# Patient Record
Sex: FEMALE | Race: WHITE | Employment: OTHER | URBAN - METROPOLITAN AREA
[De-identification: names, ages, dates, MRNs, and addresses within clinical notes are randomized per-mention and may not be internally consistent; named-entity substitution may affect disease eponyms.]

---

## 2019-01-01 ENCOUNTER — TELEPHONE (OUTPATIENT)
Dept: GERIATRICS | Facility: CLINIC | Age: 84
End: 2019-01-01

## 2019-09-06 ENCOUNTER — TRANSFERRED RECORDS (OUTPATIENT)
Dept: HEALTH INFORMATION MANAGEMENT | Facility: CLINIC | Age: 84
End: 2019-09-06

## 2019-09-06 ENCOUNTER — NURSING HOME VISIT (OUTPATIENT)
Dept: GERIATRICS | Facility: CLINIC | Age: 84
End: 2019-09-06
Payer: COMMERCIAL

## 2019-09-06 ENCOUNTER — DOCUMENTATION ONLY (OUTPATIENT)
Dept: OTHER | Facility: CLINIC | Age: 84
End: 2019-09-06

## 2019-09-06 VITALS
HEART RATE: 86 BPM | WEIGHT: 224.6 LBS | OXYGEN SATURATION: 92 % | RESPIRATION RATE: 20 BRPM | DIASTOLIC BLOOD PRESSURE: 83 MMHG | TEMPERATURE: 97.8 F | SYSTOLIC BLOOD PRESSURE: 160 MMHG

## 2019-09-06 DIAGNOSIS — G47.00 INSOMNIA, UNSPECIFIED TYPE: ICD-10-CM

## 2019-09-06 DIAGNOSIS — M62.81 GENERALIZED MUSCLE WEAKNESS: Primary | ICD-10-CM

## 2019-09-06 DIAGNOSIS — G30.1 LATE ONSET ALZHEIMER'S DISEASE WITHOUT BEHAVIORAL DISTURBANCE (H): ICD-10-CM

## 2019-09-06 DIAGNOSIS — M79.604 PAIN IN BOTH LOWER EXTREMITIES: ICD-10-CM

## 2019-09-06 DIAGNOSIS — E03.9 HYPOTHYROIDISM, UNSPECIFIED TYPE: ICD-10-CM

## 2019-09-06 DIAGNOSIS — I50.812 HYPERTENSIVE HEART DISEASE WITH CHRONIC RIGHT-SIDED CONGESTIVE HEART FAILURE (H): ICD-10-CM

## 2019-09-06 DIAGNOSIS — F02.80 LATE ONSET ALZHEIMER'S DISEASE WITHOUT BEHAVIORAL DISTURBANCE (H): ICD-10-CM

## 2019-09-06 DIAGNOSIS — G62.9 PERIPHERAL POLYNEUROPATHY: ICD-10-CM

## 2019-09-06 DIAGNOSIS — M79.605 PAIN IN BOTH LOWER EXTREMITIES: ICD-10-CM

## 2019-09-06 DIAGNOSIS — I11.0 HYPERTENSIVE HEART DISEASE WITH CHRONIC RIGHT-SIDED CONGESTIVE HEART FAILURE (H): ICD-10-CM

## 2019-09-06 DIAGNOSIS — R29.6 FALLS FREQUENTLY: ICD-10-CM

## 2019-09-06 DIAGNOSIS — M48.04 SPINAL STENOSIS OF THORACIC REGION: ICD-10-CM

## 2019-09-06 PROCEDURE — 99310 SBSQ NF CARE HIGH MDM 45: CPT | Performed by: NURSE PRACTITIONER

## 2019-09-06 RX ORDER — FUROSEMIDE 40 MG
40 TABLET ORAL DAILY
COMMUNITY
End: 2020-01-01

## 2019-09-06 RX ORDER — LEVOTHYROXINE SODIUM 25 UG/1
25 TABLET ORAL DAILY
COMMUNITY
End: 2020-01-01

## 2019-09-06 RX ORDER — QUETIAPINE FUMARATE 25 MG/1
25 TABLET, FILM COATED ORAL 2 TIMES DAILY
COMMUNITY
End: 2020-01-01

## 2019-09-06 RX ORDER — LANOLIN ALCOHOL/MO/W.PET/CERES
6 CREAM (GRAM) TOPICAL AT BEDTIME
COMMUNITY
End: 2020-01-01

## 2019-09-06 RX ORDER — METOPROLOL SUCCINATE 25 MG/1
12.5 TABLET, EXTENDED RELEASE ORAL DAILY
COMMUNITY

## 2019-09-06 RX ORDER — ACETAMINOPHEN 325 MG/1
650 TABLET ORAL EVERY 4 HOURS PRN
COMMUNITY
End: 2019-09-06

## 2019-09-06 RX ORDER — LISINOPRIL 2.5 MG/1
5 TABLET ORAL DAILY
COMMUNITY

## 2019-09-06 RX ORDER — AMOXICILLIN 250 MG
1 CAPSULE ORAL DAILY PRN
COMMUNITY

## 2019-09-06 RX ORDER — TRAZODONE HYDROCHLORIDE 50 MG/1
12.5 TABLET, FILM COATED ORAL AT BEDTIME
COMMUNITY
End: 2019-10-23

## 2019-09-06 RX ORDER — ACETAMINOPHEN 500 MG
1000 TABLET ORAL 3 TIMES DAILY
COMMUNITY

## 2019-09-06 NOTE — LETTER
9/6/2019        RE: Lizzy Adames  [address]  [place] [address]        [facility]  PRIMARY CARE PROVIDER AND CLINIC:  No primary care provider on file., No primary physician on file.  Chief Complaint   Patient presents with     Establish Care     [place] Medical Record Number:  [id number]  Place of Service where encounter took place:  [facility] ([facility] [[id number]]    Lizzy Adames  is a 89 year old  (7/21/1926), admitted to the above facility from  [facility], [place]. Hospital stay 9/2 through 9/4..  Admitted to this facility for  rehab.    HPI:    HPI information obtained from: facility chart records, facility staff, patient report, [facility] chart review, Care Everywhere Epic chart review and family/first contact jai report.   Brief Summary of Hospital Course:   89 y.o. female with a history of breast cancer, cardiomyopathy, congestive heart failure, hypertension, hypertension, mild Alzheimer's dementia with some behavior issues, peripheral neuropathy had been brought into the emergency room due to weakness. Patient is living at assisted in assisted living facility. She has been gradually getting weaker over the last 3 weeks, having more difficulties transferring and non ambulatory x 2 months. The assisted-living facility did not feel that he could safely care for her any longer.     Patient was admitted to the hospital with weakness. Labs were unremarkable except for a mildly elevated TSH and low normal T4. Given her cognitive impairment, low dose synthroid was started. CT lumbar spine obtained due to right lower extremity atrophy at calf, which showed some moderate spinal stenosis, but no neurologic compromise. Patient worked with physical therapy, and was felt to be at her baseline.     Updates on Status Since Skilled nursing Admission:   Nursing reports severe pain in legs with minimal leg movement. Non ambulatory x 2 months per patient's daughter.   Met  with patient in her room today, she is lying in bed. She is alert to self only. She winces with minimal movement to lower legs. No signs of pain when at rest. Breathing non labored.     CODE STATUS/ADVANCE DIRECTIVES DISCUSSION:   DNR only  Patient's living condition: lives alone  ALLERGIES: Aspirin and Penicillins  PAST MEDICAL HISTORY:  has no past medical history on file.  PAST SURGICAL HISTORY:   has no past surgical history on file.  FAMILY HISTORY: family history is not on file.  SOCIAL HISTORY:       Post Discharge Medication Reconciliation Status: discharge medications reconciled, continue medications without change    Current Outpatient Medications   Medication Sig Dispense Refill     acetaminophen (TYLENOL) 500 MG tablet Take 1,000 mg by mouth 3 times daily       furosemide (LASIX) 40 MG tablet Take 40 mg by mouth daily       levothyroxine (SYNTHROID/LEVOTHROID) 25 MCG tablet Take 25 mcg by mouth daily       lisinopril (PRINIVIL/ZESTRIL) 2.5 MG tablet Take 2.5 mg by mouth daily       melatonin 3 MG tablet Take 1 mg by mouth nightly as needed for sleep       metoprolol succinate ER (TOPROL-XL) 25 MG 24 hr tablet Take 25 mg by mouth daily       miconazole (MICATIN) 2 % AERP powder Apply to affected area topically 2 times daily as needed       phenylephrine-shark liver oil-mineral oil-petrolatum (PREPARATION H) 0.25-14-74.9 % rectal ointment Place rectally 3 times daily as needed for hemorrhoids       QUEtiapine (SEROQUEL) 25 MG tablet Take 25 mg by mouth 2 times daily       senna-docusate (SENOKOT-S/PERICOLACE) 8.6-50 MG tablet Take 1 tablet by mouth daily as needed for constipation       traZODone (DESYREL) 50 MG tablet Take 25 mg by mouth At Bedtime         ROS:  Limited secondary to cognitive impairment but today pt reports pain in legs    Vitals:  BP (!) 160/83   Pulse 86   Temp 97.8  F (36.6  C)   Resp 20   Wt 101.9 kg (224 lb 9.6 oz)   SpO2 92%   Exam:  GENERAL APPEARANCE:  morbidly obese, not  able to follow commands, alert  RESP:  lungs clear to auscultation , no respiratory distress  CV:  regular rate and rhythm, no murmur, rub, or gallop, + 1 BLE  ABDOMEN:  obese, soft, non tender, no guarding or rebound, bowel sounds normal  M/S:   Moves BUE, not able to move legs, right leg externally rotated and slightly shorter than left  NEURO:   radicular pain down sherry legs  PSYCH:  insight and judgement impaired, memory impaired , alert to self only, verbal, calm    Lab/Diagnostic data:  Recent labs in [facility] reviewed by me today.     ASSESSMENT/PLAN:  Generalized muscle weakness  Falls frequently  Peripheral polyneuropathy  Spinal stenosis  Pain in bilateral lower extremities  - Reviewed ER notes. Lumbar imaging with spinal stenosis  - patient in TCU x one day. Currently having severe pain. Non ambulatory at baseline.   - Will get xray of hip/pelvis and femur. Discussed with patient's dtr Marlene. Dtr would NOT a surgical intervention if fracture were present. Discussed options such as a comfort based approach. Family would like to discuss options if fracture present such as non wt bearing and comfort.   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 Per patient's dtr was ambulating until about 2 months ago  - hx of behaviors, none currently- continue seroquel  - will have OT eval    Insomnia, unspecified type  - per hx. Patient on melatonin and trazadone  - nsg to update with concerns    Hypertensive heart disease with chronic right-sided congestive heart failure (H)  - chronic edema per dtr, no changes  - abd non distended  - plan to monitor wts and vitals, nsg to update with concenrs.     Hypothyroidism, unspecified type  - started on low dose synthroid 9/3/19  - hospital labs:         transcribed by : Natividad  Ct  Orders:  1. Pelvis xray, R hip 3 view xray, R femur xray - dx: Pain/fall  2. CBC, CMP, Sed Rate, Vit. D - dx: acute pain, confusion  3. OT update with COG scores  4. Tylenol 1000 mg TID - dx: Pain  5. Call Leah NP first with xray results, if no answer ok to call on-call provider    Total time spent with patient visit at the skilled nursing facility was 45 min including patient visit, review of past records and phone call to patient contact. Greater than 50% of total time spent with counseling and coordinating care due to coordinating care with nurse on admission orders and counseling family for possibel fracture treatment and end of life wishes minutes on: the plan of SNF stay and projected length of stay, current medications (treatments) reconciled from the hospital and current pain control plan and controlled substances ordered and taper plan of care.    Electronically signed by:  KENYON Allen CNP                       Sincerely,        KENYON Allen CNP

## 2019-09-06 NOTE — PROGRESS NOTES
[facility]  PRIMARY CARE PROVIDER AND CLINIC:  No primary care provider on file., No primary physician on file.  Chief Complaint   Patient presents with     Establish Care     [place] Medical Record Number:  [id number]  Place of Service where encounter took place:  [facility] ([facility] [[id number]]    Lizzy Adames  is a 89 year old  (7/21/1926), admitted to the above facility from  [facility]. Hospital stay 9/2 through 9/4..  Admitted to this facility for  rehab.    HPI:    HPI information obtained from: facility chart records, facility staff, patient report, [facility] chart review, Care Everywhere Epic chart review and family/first contact jai report.   Brief Summary of Hospital Course:   89 y.o. female with a history of breast cancer, cardiomyopathy, congestive heart failure, hypertension, hypertension, mild Alzheimer's dementia with some behavior issues, peripheral neuropathy had been brought into the emergency room due to weakness. Patient is living at assisted in assisted living facility. She has been gradually getting weaker over the last 3 weeks, having more difficulties transferring and non ambulatory x 2 months. The assisted-living facility did not feel that he could safely care for her any longer.     Patient was admitted to the hospital with weakness. Labs were unremarkable except for a mildly elevated TSH and low normal T4. Given her cognitive impairment, low dose synthroid was started. CT lumbar spine obtained due to right lower extremity atrophy at calf, which showed some moderate spinal stenosis, but no neurologic compromise. Patient worked with physical therapy, and was felt to be at her baseline.     Updates on Status Since Skilled nursing Admission:   Nursing reports severe pain in legs with minimal leg movement. Non ambulatory x 2 months per patient's daughter.   Met with patient in her room today, she is lying in bed. She is alert to self  only. She winces with minimal movement to lower legs. No signs of pain when at rest. Breathing non labored.     CODE STATUS/ADVANCE DIRECTIVES DISCUSSION:   DNR only  Patient's living condition: lives alone  ALLERGIES: Aspirin and Penicillins  PAST MEDICAL HISTORY:  has no past medical history on file.  PAST SURGICAL HISTORY:   has no past surgical history on file.  FAMILY HISTORY: family history is not on file.  SOCIAL HISTORY:       Post Discharge Medication Reconciliation Status: discharge medications reconciled, continue medications without change    Current Outpatient Medications   Medication Sig Dispense Refill     acetaminophen (TYLENOL) 500 MG tablet Take 1,000 mg by mouth 3 times daily       furosemide (LASIX) 40 MG tablet Take 40 mg by mouth daily       levothyroxine (SYNTHROID/LEVOTHROID) 25 MCG tablet Take 25 mcg by mouth daily       lisinopril (PRINIVIL/ZESTRIL) 2.5 MG tablet Take 2.5 mg by mouth daily       melatonin 3 MG tablet Take 1 mg by mouth nightly as needed for sleep       metoprolol succinate ER (TOPROL-XL) 25 MG 24 hr tablet Take 25 mg by mouth daily       miconazole (MICATIN) 2 % AERP powder Apply to affected area topically 2 times daily as needed       phenylephrine-shark liver oil-mineral oil-petrolatum (PREPARATION H) 0.25-14-74.9 % rectal ointment Place rectally 3 times daily as needed for hemorrhoids       QUEtiapine (SEROQUEL) 25 MG tablet Take 25 mg by mouth 2 times daily       senna-docusate (SENOKOT-S/PERICOLACE) 8.6-50 MG tablet Take 1 tablet by mouth daily as needed for constipation       traZODone (DESYREL) 50 MG tablet Take 25 mg by mouth At Bedtime         ROS:  Limited secondary to cognitive impairment but today pt reports pain in legs    Vitals:  BP (!) 160/83   Pulse 86   Temp 97.8  F (36.6  C)   Resp 20   Wt 101.9 kg (224 lb 9.6 oz)   SpO2 92%   Exam:  GENERAL APPEARANCE:  morbidly obese, not able to follow commands, alert  RESP:  lungs clear to auscultation , no  respiratory distress  CV:  regular rate and rhythm, no murmur, rub, or gallop, + 1 BLE  ABDOMEN:  obese, soft, non tender, no guarding or rebound, bowel sounds normal  M/S:   Moves BUE, not able to move legs, right leg externally rotated and slightly shorter than left  NEURO:   radicular pain down sherry legs  PSYCH:  insight and judgement impaired, memory impaired , alert to self only, verbal, calm    Lab/Diagnostic data:  Recent labs in [facility] reviewed by me today.     ASSESSMENT/PLAN:  Generalized muscle weakness  Falls frequently  Peripheral polyneuropathy  Spinal stenosis  Pain in bilateral lower extremities  - Reviewed ER notes. Lumbar imaging with spinal stenosis  - patient in TCU x one day. Currently having severe pain. Non ambulatory at baseline.   - Will get xray of hip/pelvis and femur. Discussed with patient's dtr Marlene. Dtr would NOT a surgical intervention if fracture were present. Discussed options such as a comfort based approach. Family would like to discuss options if fracture present such as non wt bearing and comfort.   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 Per patient's dtr was ambulating until about 2 months ago  - hx of behaviors, none currently- continue seroquel  - will have OT eval    Insomnia, unspecified type  - per hx. Patient on melatonin and trazadone  - nsg to update with concerns    Hypertensive heart disease with chronic right-sided congestive heart failure (H)  - chronic edema per dtr, no changes  - abd non distended  - plan to monitor wts and vitals, nsg to update with concenrs.     Hypothyroidism, unspecified type  - started on low dose synthroid 9/3/19  - hospital labs:         transcribed by : Natividad Fofana  Orders:  1. Pelvis xray, R hip 3 view xray, R femur xray - dx:  Pain/fall  2. CBC, CMP, Sed Rate, Vit. D - dx: acute pain, confusion  3. OT update with COG scores  4. Tylenol 1000 mg TID - dx: Pain  5. Call Leah NP first with xray results, if no answer ok to call on-call provider    Total time spent with patient visit at the skilled nursing facility was 45 min including patient visit, review of past records and phone call to patient contact. Greater than 50% of total time spent with counseling and coordinating care due to coordinating care with nurse on admission orders and counseling family for possibel fracture treatment and end of life wishes minutes on: the plan of SNF stay and projected length of stay, current medications (treatments) reconciled from the hospital and current pain control plan and controlled substances ordered and taper plan of care.    Electronically signed by:  KENYON Allen CNP

## 2019-09-09 ENCOUNTER — NURSING HOME VISIT (OUTPATIENT)
Dept: GERIATRICS | Facility: CLINIC | Age: 84
End: 2019-09-09
Payer: COMMERCIAL

## 2019-09-09 VITALS
SYSTOLIC BLOOD PRESSURE: 137 MMHG | DIASTOLIC BLOOD PRESSURE: 59 MMHG | OXYGEN SATURATION: 92 % | RESPIRATION RATE: 18 BRPM | HEART RATE: 50 BPM | WEIGHT: 200 LBS | TEMPERATURE: 97.7 F

## 2019-09-09 DIAGNOSIS — I11.0 HYPERTENSIVE HEART DISEASE WITH CHRONIC RIGHT-SIDED CONGESTIVE HEART FAILURE (H): ICD-10-CM

## 2019-09-09 DIAGNOSIS — F02.80 LATE ONSET ALZHEIMER'S DISEASE WITHOUT BEHAVIORAL DISTURBANCE (H): ICD-10-CM

## 2019-09-09 DIAGNOSIS — M79.605 PAIN IN BOTH LOWER EXTREMITIES: ICD-10-CM

## 2019-09-09 DIAGNOSIS — G62.9 PERIPHERAL POLYNEUROPATHY: ICD-10-CM

## 2019-09-09 DIAGNOSIS — G47.00 INSOMNIA, UNSPECIFIED TYPE: ICD-10-CM

## 2019-09-09 DIAGNOSIS — I50.812 HYPERTENSIVE HEART DISEASE WITH CHRONIC RIGHT-SIDED CONGESTIVE HEART FAILURE (H): ICD-10-CM

## 2019-09-09 DIAGNOSIS — M62.81 GENERALIZED MUSCLE WEAKNESS: Primary | ICD-10-CM

## 2019-09-09 DIAGNOSIS — G30.1 LATE ONSET ALZHEIMER'S DISEASE WITHOUT BEHAVIORAL DISTURBANCE (H): ICD-10-CM

## 2019-09-09 DIAGNOSIS — M79.604 PAIN IN BOTH LOWER EXTREMITIES: ICD-10-CM

## 2019-09-09 PROCEDURE — 99309 SBSQ NF CARE MODERATE MDM 30: CPT | Performed by: NURSE PRACTITIONER

## 2019-09-09 RX ORDER — GABAPENTIN 100 MG/1
300 CAPSULE ORAL 3 TIMES DAILY
COMMUNITY

## 2019-09-09 NOTE — PROGRESS NOTES
[facility]  [place] Medical Record Number:  [id number]  [facility] [[id number]]  Chief Complaint   Patient presents with     RECHECK       HPI:    Lizzy Adames  is a 89 year old (7/21/1926), who is being seen today for an episodic care visit.  HPI information obtained from: facility chart records, facility staff, patient report and [facility] chart review.    Seeing patient today or a follow up.   Patient continues to report pain in both legs with minimal movement. Recent xrays reviewed with no evidence of a fracture. Patient currently working with physical therapy. Her goal is to be able to sit on the toilet.     No reports of dizzy spells or CP. Has ongoing edema.     Past Medical and Surgical History reviewed in Epic today.    MEDICATIONS:  Current Outpatient Medications   Medication Sig Dispense Refill     acetaminophen (TYLENOL) 500 MG tablet Take 1,000 mg by mouth 3 times daily       furosemide (LASIX) 40 MG tablet Take 40 mg by mouth daily       gabapentin (NEURONTIN) 100 MG capsule Take 100 mg by mouth At Bedtime       levothyroxine (SYNTHROID/LEVOTHROID) 25 MCG tablet Take 25 mcg by mouth daily       lisinopril (PRINIVIL/ZESTRIL) 2.5 MG tablet Take 2.5 mg by mouth daily       melatonin 3 MG tablet Take 1 mg by mouth nightly as needed for sleep       metoprolol succinate ER (TOPROL-XL) 25 MG 24 hr tablet Take 12.5 mg by mouth daily        miconazole (MICATIN) 2 % AERP powder Apply to affected area topically 2 times daily as needed       phenylephrine-shark liver oil-mineral oil-petrolatum (PREPARATION H) 0.25-14-74.9 % rectal ointment Place rectally 3 times daily as needed for hemorrhoids       QUEtiapine (SEROQUEL) 25 MG tablet Take 25 mg by mouth 2 times daily       senna-docusate (SENOKOT-S/PERICOLACE) 8.6-50 MG tablet Take 1 tablet by mouth daily as needed for constipation       TRAZODONE HCL PO Take  12.5 mg by mouth At Bedtime       traZODone (DESYREL) 50 MG tablet Take 12.5 mg by mouth At Bedtime            REVIEW OF SYSTEMS:  Limited secondary to cognitive impairment but today pt reports pain in her legs    Objective:  /59   Pulse 50   Temp 97.7  F (36.5  C)   Resp 18   Wt 90.7 kg (200 lb)   SpO2 92%   Exam:  GENERAL APPEARANCE:  Alert, in no distress, morbidly obese, cooperative  RESP:  lungs clear to auscultation , no respiratory distress  CV:  regular rate and rhythm, no murmur,  trace edema in lower legs  M/S:   pain with SLR and ROM bilateral hips and legs  PSYCH:  insight and judgement impaired, memory impaired     Labs:   Recent labs in [facility] reviewed by me today.     ASSESSMENT/PLAN:  Generalized muscle weakness  Pain in both lower extremities  Peripheral polyneuropathy  - xrays negative for fx  - will add gabapentin at HS, consider increase if patient tolerates as this may help w pain  - continue scheduled tylenol    Late onset Alzheimer's disease without behavioral disturbance  - no behavior concerns, alert and conversant    Hypertensive heart disease with chronic right-sided congestive heart failure (H)  - -140, HR 50-80, will decrease metoprolol  - goal SBP ~ 140 d/t advanced age and goal HR >60    Insomnia, unspecified type  - no sleep concerns reported.   - plan to decrease trazadone from 25 to 12.5  - gabapentin may help w/ sleep   - if no sleep concerns at f/u will stop trazadone      transcribed by : Natividad Fofana  Orders:  1. Decrease Metoprolol to 12.5 mg every day - dx: CHF  2. Weigh pt. Every day. Update NP if wt >3lbs in one day or 5 lbs in one week - dx: CHF  3. Decrease Trazodone to 12.5 mg at bedtime   4. Gabapentin 100 mg at bedtime - dx: leg pain    Electronically signed by:  KENYON Allen CNP

## 2019-09-09 NOTE — LETTER
9/9/2019        RE: Lizzy Adames  [address]  [place] [address]        [facility]  [place] [place] Medical Record Number:  [id number]  Place of Service where encounter took place:  [facility] [[id number]]  Chief Complaint   Patient presents with     RECHECK       HPI:    Lizzy Adames  is a 89 year old (7/21/1926), who is being seen today for an episodic care visit.  HPI information obtained from: facility chart records, facility staff, patient report and [facility] chart review.    Seeing patient today or a follow up.   Patient continues to report pain in both legs with minimal movement. Recent xrays reviewed with no evidence of a fracture. Patient currently working with physical therapy. Her goal is to be able to sit on the toilet.     No reports of dizzy spells or CP. Has ongoing edema.     Past Medical and Surgical History reviewed in Epic today.    MEDICATIONS:  Current Outpatient Medications   Medication Sig Dispense Refill     acetaminophen (TYLENOL) 500 MG tablet Take 1,000 mg by mouth 3 times daily       furosemide (LASIX) 40 MG tablet Take 40 mg by mouth daily       gabapentin (NEURONTIN) 100 MG capsule Take 100 mg by mouth At Bedtime       levothyroxine (SYNTHROID/LEVOTHROID) 25 MCG tablet Take 25 mcg by mouth daily       lisinopril (PRINIVIL/ZESTRIL) 2.5 MG tablet Take 2.5 mg by mouth daily       melatonin 3 MG tablet Take 1 mg by mouth nightly as needed for sleep       metoprolol succinate ER (TOPROL-XL) 25 MG 24 hr tablet Take 12.5 mg by mouth daily        miconazole (MICATIN) 2 % AERP powder Apply to affected area topically 2 times daily as needed       phenylephrine-shark liver oil-mineral oil-petrolatum (PREPARATION H) 0.25-14-74.9 % rectal ointment Place rectally 3 times daily as needed for hemorrhoids       QUEtiapine (SEROQUEL) 25 MG tablet Take 25 mg by mouth 2 times daily       senna-docusate (SENOKOT-S/PERICOLACE) 8.6-50 MG tablet Take 1 tablet  by mouth daily as needed for constipation       TRAZODONE HCL PO Take 12.5 mg by mouth At Bedtime       traZODone (DESYREL) 50 MG tablet Take 12.5 mg by mouth At Bedtime            REVIEW OF SYSTEMS:  Limited secondary to cognitive impairment but today pt reports pain in her legs    Objective:  /59   Pulse 50   Temp 97.7  F (36.5  C)   Resp 18   Wt 90.7 kg (200 lb)   SpO2 92%   Exam:  GENERAL APPEARANCE:  Alert, in no distress, morbidly obese, cooperative  RESP:  lungs clear to auscultation , no respiratory distress  CV:  regular rate and rhythm, no murmur,  trace edema in lower legs  M/S:   pain with SLR and ROM bilateral hips and legs  PSYCH:  insight and judgement impaired, memory impaired     Labs:   Recent labs in [facility] reviewed by me today.     ASSESSMENT/PLAN:  Generalized muscle weakness  Pain in both lower extremities  Peripheral polyneuropathy  - xrays negative for fx  - will add gabapentin at HS, consider increase if patient tolerates as this may help w pain  - continue scheduled tylenol    Late onset Alzheimer's disease without behavioral disturbance  - no behavior concerns, alert and conversant    Hypertensive heart disease with chronic right-sided congestive heart failure (H)  - -140, HR 50-80, will decrease metoprolol  - goal SBP ~ 140 d/t advanced age and goal HR >60    Insomnia, unspecified type  - no sleep concerns reported.   - plan to decrease trazadone from 25 to 12.5  - gabapentin may help w/ sleep   - if no sleep concerns at f/u will stop trazadone      transcribed by : Natividad Fofana  Orders:  1. Decrease Metoprolol to 12.5 mg every day - dx: CHF  2. Weigh pt. Every day. Update NP if wt >3lbs in one day or 5 lbs in one week - dx: CHF  3. Decrease Trazodone to 12.5 mg at bedtime   4. Gabapentin 100 mg at bedtime - dx: leg pain    Electronically signed by:  KENYON Allen CNP             Sincerely,        KENYON Allen CNP

## 2019-09-12 ENCOUNTER — HOSPITAL LABORATORY (OUTPATIENT)
Facility: OTHER | Age: 84
End: 2019-09-12

## 2019-09-12 LAB
ALBUMIN SERPL-MCNC: 3.4 G/DL (ref 3.4–5)
ALP SERPL-CCNC: 91 U/L (ref 40–150)
ALT SERPL W P-5'-P-CCNC: 16 U/L (ref 0–50)
ANION GAP SERPL CALCULATED.3IONS-SCNC: 6 MMOL/L (ref 3–14)
AST SERPL W P-5'-P-CCNC: 14 U/L (ref 0–45)
BILIRUB SERPL-MCNC: 0.4 MG/DL (ref 0.2–1.3)
BUN SERPL-MCNC: 29 MG/DL (ref 7–30)
CALCIUM SERPL-MCNC: 9.1 MG/DL (ref 8.5–10.1)
CHLORIDE SERPL-SCNC: 105 MMOL/L (ref 94–109)
CO2 SERPL-SCNC: 27 MMOL/L (ref 20–32)
CREAT SERPL-MCNC: 0.88 MG/DL (ref 0.52–1.04)
CRP SERPL-MCNC: 19.8 MG/L (ref 0–8)
ERYTHROCYTE [DISTWIDTH] IN BLOOD BY AUTOMATED COUNT: 14.1 % (ref 10–15)
ERYTHROCYTE [SEDIMENTATION RATE] IN BLOOD BY WESTERGREN METHOD: 16 MM/H (ref 0–30)
GFR SERPL CREATININE-BSD FRML MDRD: 57 ML/MIN/{1.73_M2}
GLUCOSE SERPL-MCNC: 64 MG/DL (ref 70–99)
HCT VFR BLD AUTO: 44.5 % (ref 35–47)
HGB BLD-MCNC: 14.1 G/DL (ref 11.7–15.7)
MCH RBC QN AUTO: 29.1 PG (ref 26.5–33)
MCHC RBC AUTO-ENTMCNC: 31.7 G/DL (ref 31.5–36.5)
MCV RBC AUTO: 92 FL (ref 78–100)
PLATELET # BLD AUTO: 307 10E9/L (ref 150–450)
POTASSIUM SERPL-SCNC: 4.3 MMOL/L (ref 3.4–5.3)
PROT SERPL-MCNC: 7.4 G/DL (ref 6.8–8.8)
RBC # BLD AUTO: 4.85 10E12/L (ref 3.8–5.2)
SODIUM SERPL-SCNC: 138 MMOL/L (ref 133–144)
WBC # BLD AUTO: 7.5 10E9/L (ref 4–11)

## 2019-09-13 LAB — DEPRECATED CALCIDIOL+CALCIFEROL SERPL-MC: 38 UG/L (ref 20–75)

## 2019-09-15 ASSESSMENT — MIFFLIN-ST. JEOR: SCORE: 1475

## 2019-09-16 ENCOUNTER — NURSING HOME VISIT (OUTPATIENT)
Dept: GERIATRICS | Facility: CLINIC | Age: 84
End: 2019-09-16
Payer: COMMERCIAL

## 2019-09-16 VITALS
OXYGEN SATURATION: 95 % | SYSTOLIC BLOOD PRESSURE: 123 MMHG | RESPIRATION RATE: 16 BRPM | TEMPERATURE: 98.2 F | DIASTOLIC BLOOD PRESSURE: 82 MMHG | WEIGHT: 225.2 LBS | BODY MASS INDEX: 34.13 KG/M2 | HEIGHT: 68 IN | HEART RATE: 67 BPM

## 2019-09-16 DIAGNOSIS — M79.604 PAIN IN BOTH LOWER EXTREMITIES: ICD-10-CM

## 2019-09-16 DIAGNOSIS — I11.0 HYPERTENSIVE HEART DISEASE WITH CHRONIC RIGHT-SIDED CONGESTIVE HEART FAILURE (H): ICD-10-CM

## 2019-09-16 DIAGNOSIS — F02.80 LATE ONSET ALZHEIMER'S DISEASE WITHOUT BEHAVIORAL DISTURBANCE (H): ICD-10-CM

## 2019-09-16 DIAGNOSIS — M35.3 PMR (POLYMYALGIA RHEUMATICA) (H): Primary | ICD-10-CM

## 2019-09-16 DIAGNOSIS — G30.1 LATE ONSET ALZHEIMER'S DISEASE WITHOUT BEHAVIORAL DISTURBANCE (H): ICD-10-CM

## 2019-09-16 DIAGNOSIS — I50.812 HYPERTENSIVE HEART DISEASE WITH CHRONIC RIGHT-SIDED CONGESTIVE HEART FAILURE (H): ICD-10-CM

## 2019-09-16 DIAGNOSIS — G62.9 PERIPHERAL POLYNEUROPATHY: ICD-10-CM

## 2019-09-16 DIAGNOSIS — G47.00 INSOMNIA, UNSPECIFIED TYPE: ICD-10-CM

## 2019-09-16 DIAGNOSIS — M79.605 PAIN IN BOTH LOWER EXTREMITIES: ICD-10-CM

## 2019-09-16 PROCEDURE — 99309 SBSQ NF CARE MODERATE MDM 30: CPT | Performed by: NURSE PRACTITIONER

## 2019-09-16 RX ORDER — ACETAMINOPHEN 325 MG/1
650 TABLET ORAL EVERY 4 HOURS PRN
COMMUNITY
End: 2019-10-07

## 2019-09-16 NOTE — LETTER
9/16/2019        RE: Lizzy Adames  [address]  [place] [address]        [place] [facility]  [place] Medical Record Number:  [id number]  Place of Service where encounter took place:  THE [facility] [[id number]]  Chief Complaint   Patient presents with     Nursing Home Acute       HPI:    Lizzy Adames  is a 89 year old (7/21/1926), who is being seen today for an episodic care visit.  HPI information obtained from: facility chart records, facility staff and patient report.     Seeing patient today for a follow up.   Patient started on prednisone 3 days ago due to elevated CRP with leg weakness, bilateral hip and leg pain.   Patient reports less pain today.   Saw patient in her room with physical therapy. Therapy reports increased leg and hip strenght since start of prednisone. Patient able to stand with assist today, and was not able to do this prior to prednisone,     Past Medical and Surgical History reviewed in Epic today.    MEDICATIONS:  Current Outpatient Medications   Medication Sig Dispense Refill     acetaminophen (TYLENOL) 325 MG tablet Take 650 mg by mouth every 4 hours as needed for mild pain       acetaminophen (TYLENOL) 500 MG tablet Take 1,000 mg by mouth 3 times daily       furosemide (LASIX) 40 MG tablet Take 40 mg by mouth daily       gabapentin (NEURONTIN) 100 MG capsule Take 100 mg by mouth At Bedtime       levothyroxine (SYNTHROID/LEVOTHROID) 25 MCG tablet Take 25 mcg by mouth daily       lisinopril (PRINIVIL/ZESTRIL) 2.5 MG tablet Take 2.5 mg by mouth daily       melatonin 3 MG tablet Take 6 mg by mouth nightly as needed for sleep        metoprolol succinate ER (TOPROL-XL) 25 MG 24 hr tablet Take 12.5 mg by mouth daily        miconazole (MICATIN) 2 % AERP powder Apply to affected area topically 2 times daily as needed       phenylephrine-shark liver oil-mineral oil-petrolatum (PREPARATION H) 0.25-14-74.9 % rectal ointment Place rectally 3 times daily as  "needed for hemorrhoids       PREDNISONE PO Take 12.5 mg by mouth daily       QUEtiapine (SEROQUEL) 25 MG tablet Take 25 mg by mouth 2 times daily       senna-docusate (SENOKOT-S/PERICOLACE) 8.6-50 MG tablet Take 1 tablet by mouth daily as needed for constipation       TRAZODONE HCL PO Take 12.5 mg by mouth At Bedtime       traZODone (DESYREL) 50 MG tablet Take 12.5 mg by mouth At Bedtime          REVIEW OF SYSTEMS:  Limited secondary to cognitive impairment but today pt reports no concerns    Objective:  /82   Pulse 67   Temp 98.2  F (36.8  C)   Resp 16   Ht 1.727 m (5' 8\")   Wt 102.2 kg (225 lb 3.2 oz)   SpO2 95%   BMI 34.24 kg/m     Exam:  GENERAL APPEARANCE:  Alert, in no distress  RESP:  auscultation of lungs clear , no respiratory distress  CV:  rate and rhythm reg, no murmur, no peripheral edema  ABDOMEN:  normal bowel sounds, soft, nontender, no hepatosplenomegaly or other masses  M/S:   Gait and station stand with assist, FU's  SKIN:  Inspection and Palpation of skin and subcutaneous tissue no rashes or lesions to exposed skin  NEURO: 2-12 in normal limits and at patient's baseline  PSYCH:  insight and judgement, memory impaired , affect and mood Pleasant       Labs:     CBC RESULTS:   Recent Labs   Lab Test 09/12/19  0800   WBC 7.5   RBC 4.85   HGB 14.1   HCT 44.5   MCV 92   MCH 29.1   MCHC 31.7   RDW 14.1          Last Basic Metabolic Panel:  Recent Labs   Lab Test 09/12/19  0800      POTASSIUM 4.3   CHLORIDE 105   RONNELL 9.1   CO2 27   BUN 29   CR 0.88   GLC 64*       Liver Function Studies -   Recent Labs   Lab Test 09/12/19  0800   PROTTOTAL 7.4   ALBUMIN 3.4   BILITOTAL 0.4   ALKPHOS 91   AST 14   ALT 16       Most Recent ESR & CRP:  Recent Labs   Lab Test 09/12/19  0800   SED 16   CRP 19.8*       ASSESSMENT/PLAN:  PMR (polymyalgia rheumatica) (H)  Pain in both lower extremities  Peripheral polyneuropathy  - CRP elevated with positive response to prednisone, will continue and " recheck labs  - continue physical therapy     Late onset Alzheimer's disease without behavioral disturbance  Insomnia, unspecified type  - Doing well in TCU with no mood or behavior concerns. Patient hoping to return to AL, may need higher level of care. Will re-eval  - no sleep concerns. On melatonin, gabapentin and trazodone at HS. Will discontinue trazodone, nsg to update w/ concerns    Hypertensive heart disease with chronic right-sided congestive heart failure (H)  - per records patient wt up 25 lbs  - lungs clear with no edema or noticeable wt gain. No new orders. Continue to monitor for signs of fluid retention      Orders:  1. Discontinue Trazodone  2. CRP - dx: PMR  3. Weight up 25 lbs per PCC. Lungs clear 3 edema - zero changes      Electronically signed by:  KENYON Allen CNP             Sincerely,        KENYON Allen CNP

## 2019-09-16 NOTE — PROGRESS NOTES
[place] [facility]  [place] Medical Record Number:  [id number]  Place of Service where encounter took place[facility] ([facility] [[id number]]  Chief Complaint   Patient presents with     Nursing Home Acute       HPI:    Lizzy Adames  is a 89 year old (7/21/1926), who is being seen today for an episodic care visit.  HPI information obtained from: facility chart records, facility staff and patient report.     Seeing patient today for a follow up.   Patient started on prednisone 3 days ago due to elevated CRP with leg weakness, bilateral hip and leg pain.   Patient reports less pain today.   Saw patient in her room with physical therapy. Therapy reports increased leg and hip strenght since start of prednisone. Patient able to stand with assist today, and was not able to do this prior to prednisone,     Past Medical and Surgical History reviewed in Epic today.    MEDICATIONS:  Current Outpatient Medications   Medication Sig Dispense Refill     acetaminophen (TYLENOL) 325 MG tablet Take 650 mg by mouth every 4 hours as needed for mild pain       acetaminophen (TYLENOL) 500 MG tablet Take 1,000 mg by mouth 3 times daily       furosemide (LASIX) 40 MG tablet Take 40 mg by mouth daily       gabapentin (NEURONTIN) 100 MG capsule Take 100 mg by mouth At Bedtime       levothyroxine (SYNTHROID/LEVOTHROID) 25 MCG tablet Take 25 mcg by mouth daily       lisinopril (PRINIVIL/ZESTRIL) 2.5 MG tablet Take 2.5 mg by mouth daily       melatonin 3 MG tablet Take 6 mg by mouth nightly as needed for sleep        metoprolol succinate ER (TOPROL-XL) 25 MG 24 hr tablet Take 12.5 mg by mouth daily        miconazole (MICATIN) 2 % AERP powder Apply to affected area topically 2 times daily as needed       phenylephrine-shark liver oil-mineral oil-petrolatum (PREPARATION H) 0.25-14-74.9 % rectal ointment Place rectally 3 times daily as needed for hemorrhoids       PREDNISONE PO Take 12.5 mg by mouth daily    "    QUEtiapine (SEROQUEL) 25 MG tablet Take 25 mg by mouth 2 times daily       senna-docusate (SENOKOT-S/PERICOLACE) 8.6-50 MG tablet Take 1 tablet by mouth daily as needed for constipation       TRAZODONE HCL PO Take 12.5 mg by mouth At Bedtime       traZODone (DESYREL) 50 MG tablet Take 12.5 mg by mouth At Bedtime          REVIEW OF SYSTEMS:  Limited secondary to cognitive impairment but today pt reports no concerns    Objective:  /82   Pulse 67   Temp 98.2  F (36.8  C)   Resp 16   Ht 1.727 m (5' 8\")   Wt 102.2 kg (225 lb 3.2 oz)   SpO2 95%   BMI 34.24 kg/m    Exam:  GENERAL APPEARANCE:  Alert, in no distress  RESP:  auscultation of lungs clear , no respiratory distress  CV:  rate and rhythm reg, no murmur, no peripheral edema  ABDOMEN:  normal bowel sounds, soft, nontender, no hepatosplenomegaly or other masses  M/S:   Gait and station stand with assist, FU's  SKIN:  Inspection and Palpation of skin and subcutaneous tissue no rashes or lesions to exposed skin  NEURO: 2-12 in normal limits and at patient's baseline  PSYCH:  insight and judgement, memory impaired , affect and mood Pleasant       Labs:     CBC RESULTS:   Recent Labs   Lab Test 09/12/19  0800   WBC 7.5   RBC 4.85   HGB 14.1   HCT 44.5   MCV 92   MCH 29.1   MCHC 31.7   RDW 14.1          Last Basic Metabolic Panel:  Recent Labs   Lab Test 09/12/19  0800      POTASSIUM 4.3   CHLORIDE 105   RONNELL 9.1   CO2 27   BUN 29   CR 0.88   GLC 64*       Liver Function Studies -   Recent Labs   Lab Test 09/12/19  0800   PROTTOTAL 7.4   ALBUMIN 3.4   BILITOTAL 0.4   ALKPHOS 91   AST 14   ALT 16       Most Recent ESR & CRP:  Recent Labs   Lab Test 09/12/19  0800   SED 16   CRP 19.8*       ASSESSMENT/PLAN:  PMR (polymyalgia rheumatica) (H)  Pain in both lower extremities  Peripheral polyneuropathy  - CRP elevated with positive response to prednisone, will continue and recheck labs  - continue physical therapy     Late onset Alzheimer's disease " without behavioral disturbance  Insomnia, unspecified type  - Doing well in TCU with no mood or behavior concerns. Patient hoping to return to AL, may need higher level of care. Will re-eval  - no sleep concerns. On melatonin, gabapentin and trazodone at HS. Will discontinue trazodone, nsg to update w/ concerns    Hypertensive heart disease with chronic right-sided congestive heart failure (H)  - per records patient wt up 25 lbs  - lungs clear with no edema or noticeable wt gain. No new orders. Continue to monitor for signs of fluid retention      Orders:  1. Discontinue Trazodone  2. CRP - dx: PMR  3. Weight up 25 lbs per PCC. Lungs clear 3 edema - zero changes      Electronically signed by:  KENYON Allen CNP

## 2019-09-17 VITALS
HEIGHT: 68 IN | DIASTOLIC BLOOD PRESSURE: 68 MMHG | SYSTOLIC BLOOD PRESSURE: 143 MMHG | RESPIRATION RATE: 18 BRPM | WEIGHT: 225.4 LBS | BODY MASS INDEX: 34.16 KG/M2 | OXYGEN SATURATION: 91 % | TEMPERATURE: 98 F | HEART RATE: 63 BPM

## 2019-09-17 ASSESSMENT — MIFFLIN-ST. JEOR: SCORE: 1475.91

## 2019-09-17 NOTE — PROGRESS NOTES
[place] [facility]  PRIMARY CARE PROVIDER AND CLINIC:  KENYON Allen CNP, [address] / [place] [address]  Chief Complaint   Patient presents with     Establish Care     [place] Medical Record Number:  [id number]  Place of Service where encounter took place:  [facility] ([facility]) [[id number]]    Lizzy Adames  is a 89 year old  (7/21/1926), admitted to the above facility from  [facility]. Hospital stay 9/2/2019 through 9/4/2019..  Admitted to this facility for  rehab, medical management and nursing care.    HPI:    HPI information obtained from: facility staff, patient report, [place] Epic chart review and DNP's report.   Brief Summary of Hospital Course:   - - Pt with significant PMH of  CHF, cardiomyopathy among other comorbidities, presented to the hospital with progressive weakness, work up non significant except for slight elevated TSH and low FT4, started on low dose of LT4.  - Hospitalization was c/w     Updates on Status Since Skilled nursing Admission:   - continued to have BLE pain, imaging while at facility ruled out fx or other acute pathology. Neurontin added for pain.   - metoprolol dose reduced for some soft SBP and HR in 50's range at times.   - trazodone reduced from 25 mg to 12.5 mg given no concern with insomnia,  Then stopped.   - CRP came elevated felt to have PMR, started on prednisone with good response  .     Today,  -  - Resident seen and examined.   - Reports weakness is better now, tolerating rehab program and making a progress. Reports constant burning pain over the back of right leg,  Radiates from right ankle to leg, no aggravating or alleviating factors.   - Reports sleep, appetite and BM are fine.     ================================================================  CODE STATUS/ADVANCE DIRECTIVES DISCUSSION:   DNR only  Patient's living condition: lives alone   Surgical History  Surgery Date Site/Laterality Comments    COLONOSCOPY SCREENING    normal     CATARACT REMOVAL         Medical History      Medical History Date Comments   Calculus of gallbladder without mention of cholecystitis or obstruction       Uric acid nephrolithiasis       Coronary atherosclerosis of unspecified type of vessel, native or graft       Congestive heart failure, unspecified       Cardiomyopathy in other diseases classified elsewhere   EF 25%    Breast cancer (HC)   right mastectomy   Hypertension       Osteopenia, senile       Family History      Medical History Relation Name Comments   Cancer-breast Daughter   26   Heart Disease Father       Heart Disease Other      Cancer No Family History   no fam hx ovarian ca   Cancer-colon No Family History       Cancer-prostate No Family History         Relation Name Status Comments   Daughter    (Age 27)     Father         Mother        Other       Social History      Tobacco Use Types Packs/Day Years Used Date   Never Smoker           Smokeless Tobacco: Never Used           Tobacco Cessation: Counseling Given: No  Comments: no passive     Alcohol Use Drinks/Week oz/Week Comments   No 0 Standard drinks or equivalent   0.0       Sex Assigned at Birth Date Recorded   Not on file       Job Start Date Occupation Industry   Not on file Not on file Not on file     Travel History Travel Start Travel End   No recent travel history available.       Post Discharge Medication Reconciliation Status: discharge medications reconciled and changed, per note/orders (see AVS)  Current Outpatient Medications   Medication Sig Dispense Refill     acetaminophen (TYLENOL) 325 MG tablet Take 650 mg by mouth every 4 hours as needed for mild pain       acetaminophen (TYLENOL) 500 MG tablet Take 1,000 mg by mouth 3 times daily       furosemide (LASIX) 40 MG tablet Take 40 mg by mouth daily       gabapentin (NEURONTIN) 100 MG capsule Take 100 mg by mouth At Bedtime       levothyroxine  "(SYNTHROID/LEVOTHROID) 25 MCG tablet Take 25 mcg by mouth daily       lisinopril (PRINIVIL/ZESTRIL) 2.5 MG tablet Take 2.5 mg by mouth daily       melatonin 3 MG tablet Take 6 mg by mouth nightly as needed for sleep        metoprolol succinate ER (TOPROL-XL) 25 MG 24 hr tablet Take 12.5 mg by mouth daily        miconazole (MICATIN) 2 % AERP powder Apply to affected area topically 2 times daily as needed       phenylephrine-shark liver oil-mineral oil-petrolatum (PREPARATION H) 0.25-14-74.9 % rectal ointment Place rectally 3 times daily as needed for hemorrhoids       PREDNISONE PO Take 12.5 mg by mouth daily       QUEtiapine (SEROQUEL) 25 MG tablet Take 25 mg by mouth 2 times daily       senna-docusate (SENOKOT-S/PERICOLACE) 8.6-50 MG tablet Take 1 tablet by mouth daily as needed for constipation       traZODone (DESYREL) 50 MG tablet Take 12.5 mg by mouth At Bedtime        TRAZODONE HCL PO Take 12.5 mg by mouth At Bedtime         ROS:  Limited secondary to cognitive impairment but today pt reports- see HP    Vitals:  BP (!) 143/68   Pulse 63   Temp 98  F (36.7  C)   Resp 18   Ht 1.727 m (5' 8\")   Wt 102.2 kg (225 lb 6.4 oz)   SpO2 91%   BMI 34.27 kg/m    Exam:  GENERAL APPEARANCE:  in no distress, cooperative  ENT:  Mouth and posterior oropharynx normal, moist mucous membranes, oral mucosa moist, no lesion noted.   EYES:  EOMI, Pupil rounded and equal.  RESP:  lungs clear to auscultation   CV:  S1S2 audible, regular HR, no murmur appreciated.   ABDOMEN:  soft, NT/ND, BS audible. no mass appreciated on palpation.   M/S:   no joint deformity noted on observation.   SKIN:  No rash.   NEURO:   Ms strength 45 b/l hands , and 35 BLE.   PSYCH: AAOx Person/Place/ not time. Fair insight, judgement and memory, speech clear, affected    Lab/Diagnostic data: Reviewed in the chart and EHR.        ASSESSMENT/PLAN:  Generalized muscle weakness  Falls frequently  Peripheral polyneuropathy  Spinal stenosis  Pain in " bilateral lower extremities  - felt to have PMR based on clinical presentation and elevated CRP, with improvement with prednisone in term of pain and participation in the rehab program.   - on gabapentin, may increase dose.   - started rehab program, making a progress, continue until desired goal is achieved.     Hypertensive heart disease with chronic right-sided congestive heart failure (H)  - BP is better now, acceptable for this frail Pt with limited life expectancy, and compensated cardiac status.     Hypothyroidism, unspecified type:  - TSH 6.21,  FT4 0.76, on LT4. Continue    Late onset Alzheimer's disease without behavioral disturbance:  - Continue to anticipate needs. Chronic condition, ongoing decline expected.   -  Continue to provide redirection and reassurance as needed. Maintain safe living situation with goals focused on comfort.    Electronically signed by:  Ирина Glass MD

## 2019-09-18 ENCOUNTER — NURSING HOME VISIT (OUTPATIENT)
Dept: GERIATRICS | Facility: CLINIC | Age: 84
End: 2019-09-18
Payer: COMMERCIAL

## 2019-09-18 DIAGNOSIS — I50.812 HYPERTENSIVE HEART DISEASE WITH CHRONIC RIGHT-SIDED CONGESTIVE HEART FAILURE (H): ICD-10-CM

## 2019-09-18 DIAGNOSIS — E03.9 HYPOTHYROIDISM, UNSPECIFIED TYPE: ICD-10-CM

## 2019-09-18 DIAGNOSIS — I11.0 HYPERTENSIVE HEART DISEASE WITH CHRONIC RIGHT-SIDED CONGESTIVE HEART FAILURE (H): ICD-10-CM

## 2019-09-18 DIAGNOSIS — G30.1 LATE ONSET ALZHEIMER'S DISEASE WITHOUT BEHAVIORAL DISTURBANCE (H): ICD-10-CM

## 2019-09-18 DIAGNOSIS — M62.81 GENERALIZED MUSCLE WEAKNESS: ICD-10-CM

## 2019-09-18 DIAGNOSIS — F02.80 LATE ONSET ALZHEIMER'S DISEASE WITHOUT BEHAVIORAL DISTURBANCE (H): ICD-10-CM

## 2019-09-18 DIAGNOSIS — M35.3 PMR (POLYMYALGIA RHEUMATICA) (H): ICD-10-CM

## 2019-09-18 DIAGNOSIS — G62.9 PERIPHERAL POLYNEUROPATHY: Primary | ICD-10-CM

## 2019-09-18 DIAGNOSIS — M48.04 SPINAL STENOSIS OF THORACIC REGION: ICD-10-CM

## 2019-09-18 DIAGNOSIS — M79.605 PAIN IN BOTH LOWER EXTREMITIES: ICD-10-CM

## 2019-09-18 DIAGNOSIS — M79.604 PAIN IN BOTH LOWER EXTREMITIES: ICD-10-CM

## 2019-09-18 PROCEDURE — 99305 1ST NF CARE MODERATE MDM 35: CPT | Mod: AI | Performed by: FAMILY MEDICINE

## 2019-09-18 NOTE — LETTER
9/18/2019        RE: Lizzy Adames  [address]  [place] [address]        [place] [facility]  PRIMARY CARE PROVIDER AND CLINIC:  KENYON Allen CNP, [address] / [place] [address]  Chief Complaint   Patient presents with     Establish Care     [place] Medical Record Number:  [id number]  Place of Service where encounter took place:  [facility] ([facility]) [[id number]]    Lizzy Adames  is a 89 year old  (7/21/1926), admitted to the above facility from  [facility] [place]. Hospital stay 9/2/2019 through 9/4/2019..  Admitted to this facility for  rehab, medical management and nursing care.    HPI:    HPI information obtained from: facility staff, patient report, [facility] chart review and DNP's report.   Brief Summary of Hospital Course:   - - Pt with significant PMH of  CHF, cardiomyopathy among other comorbidities, presented to the hospital with progressive weakness, work up non significant except for slight elevated TSH and low FT4, started on low dose of LT4.  - Hospitalization was c/w     Updates on Status Since Skilled nursing Admission:   - continued to have BLE pain, imaging while at facility ruled out fx or other acute pathology. Neurontin added for pain.   - metoprolol dose reduced for some soft SBP and HR in 50's range at times.   - trazodone reduced from 25 mg to 12.5 mg given no concern with insomnia,  Then stopped.   - CRP came elevated felt to have PMR, started on prednisone with good response  .     Today,  -  - Resident seen and examined.   - Reports weakness is better now, tolerating rehab program and making a progress. Reports constant burning pain over the back of right leg,  Radiates from right ankle to leg, no aggravating or alleviating factors.   - Reports sleep, appetite and BM are fine.     ================================================================  CODE STATUS/ADVANCE DIRECTIVES DISCUSSION:   DNR only  Patient's living condition: lives  alone   Surgical History  Surgery Date Site/Laterality Comments   COLONOSCOPY SCREENING    normal     CATARACT REMOVAL         Medical History      Medical History Date Comments   Calculus of gallbladder without mention of cholecystitis or obstruction       Uric acid nephrolithiasis       Coronary atherosclerosis of unspecified type of vessel, native or graft       Congestive heart failure, unspecified       Cardiomyopathy in other diseases classified elsewhere   EF 25%    Breast cancer (HC)   right mastectomy   Hypertension       Osteopenia, senile       Family History      Medical History Relation Name Comments   Cancer-breast Daughter   26   Heart Disease Father       Heart Disease Other      Cancer No Family History   no fam hx ovarian ca   Cancer-colon No Family History       Cancer-prostate No Family History         Relation Name Status Comments   Daughter    (Age 27)     Father         Mother        Other       Social History      Tobacco Use Types Packs/Day Years Used Date   Never Smoker           Smokeless Tobacco: Never Used           Tobacco Cessation: Counseling Given: No  Comments: no passive     Alcohol Use Drinks/Week oz/Week Comments   No 0 Standard drinks or equivalent   0.0       Sex Assigned at Birth Date Recorded   Not on file       Job Start Date Occupation Industry   Not on file Not on file Not on file     Travel History Travel Start Travel End   No recent travel history available.       Post Discharge Medication Reconciliation Status: discharge medications reconciled and changed, per note/orders (see AVS)  Current Outpatient Medications   Medication Sig Dispense Refill     acetaminophen (TYLENOL) 325 MG tablet Take 650 mg by mouth every 4 hours as needed for mild pain       acetaminophen (TYLENOL) 500 MG tablet Take 1,000 mg by mouth 3 times daily       furosemide (LASIX) 40 MG tablet Take 40 mg by mouth daily       gabapentin (NEURONTIN) 100  "MG capsule Take 100 mg by mouth At Bedtime       levothyroxine (SYNTHROID/LEVOTHROID) 25 MCG tablet Take 25 mcg by mouth daily       lisinopril (PRINIVIL/ZESTRIL) 2.5 MG tablet Take 2.5 mg by mouth daily       melatonin 3 MG tablet Take 6 mg by mouth nightly as needed for sleep        metoprolol succinate ER (TOPROL-XL) 25 MG 24 hr tablet Take 12.5 mg by mouth daily        miconazole (MICATIN) 2 % AERP powder Apply to affected area topically 2 times daily as needed       phenylephrine-shark liver oil-mineral oil-petrolatum (PREPARATION H) 0.25-14-74.9 % rectal ointment Place rectally 3 times daily as needed for hemorrhoids       PREDNISONE PO Take 12.5 mg by mouth daily       QUEtiapine (SEROQUEL) 25 MG tablet Take 25 mg by mouth 2 times daily       senna-docusate (SENOKOT-S/PERICOLACE) 8.6-50 MG tablet Take 1 tablet by mouth daily as needed for constipation       traZODone (DESYREL) 50 MG tablet Take 12.5 mg by mouth At Bedtime        TRAZODONE HCL PO Take 12.5 mg by mouth At Bedtime         ROS:  Limited secondary to cognitive impairment but today pt reports- see HP    Vitals:  BP (!) 143/68   Pulse 63   Temp 98  F (36.7  C)   Resp 18   Ht 1.727 m (5' 8\")   Wt 102.2 kg (225 lb 6.4 oz)   SpO2 91%   BMI 34.27 kg/m     Exam:  GENERAL APPEARANCE:  in no distress, cooperative  ENT:  Mouth and posterior oropharynx normal, moist mucous membranes, oral mucosa moist, no lesion noted.   EYES:  EOMI, Pupil rounded and equal.  RESP:  lungs clear to auscultation   CV:  S1S2 audible, regular HR, no murmur appreciated.   ABDOMEN:  soft, NT/ND, BS audible. no mass appreciated on palpation.   M/S:   no joint deformity noted on observation.   SKIN:  No rash.   NEURO:   Ms strength 45 b/l hands , and 35 BLE.   PSYCH: AAOx Person/Place/ not time. Fair insight, judgement and memory, speech clear, affected    Lab/Diagnostic data: Reviewed in the chart and EHR.        ASSESSMENT/PLAN:  Generalized muscle weakness  Falls " frequently  Peripheral polyneuropathy  Spinal stenosis  Pain in bilateral lower extremities  - felt to have PMR based on clinical presentation and elevated CRP, with improvement with prednisone in term of pain and participation in the rehab program.   - on gabapentin, may increase dose.   - started rehab program, making a progress, continue until desired goal is achieved.     Hypertensive heart disease with chronic right-sided congestive heart failure (H)  - BP is better now, acceptable for this frail Pt with limited life expectancy, and compensated cardiac status.     Hypothyroidism, unspecified type:  - TSH 6.21,  FT4 0.76, on LT4. Continue    Late onset Alzheimer's disease without behavioral disturbance:  - Continue to anticipate needs. Chronic condition, ongoing decline expected.   -  Continue to provide redirection and reassurance as needed. Maintain safe living situation with goals focused on comfort.    Electronically signed by:  Ирина Glass MD                       Sincerely,        Ирина Glass MD

## 2019-09-19 ENCOUNTER — HOSPITAL LABORATORY (OUTPATIENT)
Facility: OTHER | Age: 84
End: 2019-09-19

## 2019-09-19 LAB — CRP SERPL-MCNC: 15.8 MG/L (ref 0–8)

## 2019-09-23 ENCOUNTER — TRANSFERRED RECORDS (OUTPATIENT)
Dept: HEALTH INFORMATION MANAGEMENT | Facility: CLINIC | Age: 84
End: 2019-09-23

## 2019-09-23 ENCOUNTER — NURSING HOME VISIT (OUTPATIENT)
Dept: GERIATRICS | Facility: CLINIC | Age: 84
End: 2019-09-23
Payer: COMMERCIAL

## 2019-09-23 VITALS
RESPIRATION RATE: 16 BRPM | HEIGHT: 68 IN | OXYGEN SATURATION: 90 % | DIASTOLIC BLOOD PRESSURE: 83 MMHG | SYSTOLIC BLOOD PRESSURE: 157 MMHG | WEIGHT: 222 LBS | BODY MASS INDEX: 33.65 KG/M2 | TEMPERATURE: 98 F | HEART RATE: 72 BPM

## 2019-09-23 DIAGNOSIS — M48.04 SPINAL STENOSIS OF THORACIC REGION: ICD-10-CM

## 2019-09-23 DIAGNOSIS — R29.6 FALLS FREQUENTLY: ICD-10-CM

## 2019-09-23 DIAGNOSIS — F02.80 LATE ONSET ALZHEIMER'S DISEASE WITHOUT BEHAVIORAL DISTURBANCE (H): ICD-10-CM

## 2019-09-23 DIAGNOSIS — G89.29 CHRONIC PAIN OF RIGHT KNEE: ICD-10-CM

## 2019-09-23 DIAGNOSIS — M35.3 PMR (POLYMYALGIA RHEUMATICA) (H): Primary | ICD-10-CM

## 2019-09-23 DIAGNOSIS — G30.1 LATE ONSET ALZHEIMER'S DISEASE WITHOUT BEHAVIORAL DISTURBANCE (H): ICD-10-CM

## 2019-09-23 DIAGNOSIS — M25.561 CHRONIC PAIN OF RIGHT KNEE: ICD-10-CM

## 2019-09-23 PROCEDURE — 99309 SBSQ NF CARE MODERATE MDM 30: CPT | Performed by: NURSE PRACTITIONER

## 2019-09-23 ASSESSMENT — MIFFLIN-ST. JEOR: SCORE: 1460.49

## 2019-09-23 NOTE — PROGRESS NOTES
[facility]  [place] Medical Record Number:  [id number]  Place of Service where encounter took place:  [facility] (S) [[id number]]  Chief Complaint   Patient presents with     Nursing Home Acute       HPI:    Lizzy Adames  is a 89 year old (7/21/1926), who is being seen today for an episodic care visit.  HPI information obtained from: facility chart records, facility staff, patient report, [facility] chart review and Care Everywhere [facility] chart review.     Seeing patient today for a follow up.   Patient started on prednisone for PMR with diffuse pain and elevated CRP. Initially strength improved and patient was able to stand with assist after prednisone was started.   Today staff report significant pain in patient's knee when standing with the EZ stand. Her knee appears to give out and she is not able to bear wt on right knee.     Met with patient in her room. She denies pain while sitting. She is calm and cooperative.     Past Medical and Surgical History reviewed in Epic today.    MEDICATIONS:  Current Outpatient Medications   Medication Sig Dispense Refill     acetaminophen (TYLENOL) 325 MG tablet Take 650 mg by mouth every 4 hours as needed for mild pain       acetaminophen (TYLENOL) 500 MG tablet Take 1,000 mg by mouth 3 times daily       diclofenac (VOLTAREN) 1 % topical gel Place 4 g onto the skin 3 times daily Apply to Right knee       furosemide (LASIX) 40 MG tablet Take 40 mg by mouth daily       gabapentin (NEURONTIN) 100 MG capsule Take 100 mg by mouth 3 times daily        levothyroxine (SYNTHROID/LEVOTHROID) 25 MCG tablet Take 25 mcg by mouth daily       lisinopril (PRINIVIL/ZESTRIL) 2.5 MG tablet Take 2.5 mg by mouth daily       melatonin 3 MG tablet Take 6 mg by mouth nightly as needed for sleep        metoprolol succinate ER (TOPROL-XL) 25 MG 24 hr tablet Take 12.5 mg by mouth daily        miconazole (MICATIN) 2 % AERP powder Apply to affected area  "topically 2 times daily as needed       phenylephrine-shark liver oil-mineral oil-petrolatum (PREPARATION H) 0.25-14-74.9 % rectal ointment Place rectally 3 times daily as needed for hemorrhoids       PREDNISONE PO Take 12.5 mg by mouth daily       QUEtiapine (SEROQUEL) 25 MG tablet Take 25 mg by mouth 2 times daily       senna-docusate (SENOKOT-S/PERICOLACE) 8.6-50 MG tablet Take 1 tablet by mouth daily as needed for constipation       traZODone (DESYREL) 50 MG tablet Take 12.5 mg by mouth At Bedtime        TRAZODONE HCL PO Take 12.5 mg by mouth At Bedtime         REVIEW OF SYSTEMS:  Limited secondary to cognitive impairment but today pt reports no pain    Objective:  BP (!) 157/83   Pulse 72   Temp 98  F (36.7  C)   Resp 16   Ht 1.727 m (5' 8\")   Wt 100.7 kg (222 lb)   SpO2 90%   BMI 33.75 kg/m    Exam:  GENERAL APPEARANCE:  Alert, in no distress, morbidly obese  RESP:  lungs clear to auscultation , no respiratory distress  CV:  regular rate and rhythm, no murmur, rub, or gallop, +1 BLE edema (teds on)  ABDOMEN:  soft, obese, no guarding or rebound, bowel sounds normal  M/S:   siginifcant tenderness to right medial knee, + crepitus to ROM  SKIN:  Inspection of skin and subcutaneous tissue baseline  PSYCH:  insight and judgement impaired, memory impaired , affect and mood normal    Labs:     Most Recent 3 CBC's:  Recent Labs   Lab Test 09/12/19  0800   WBC 7.5   HGB 14.1   MCV 92        Most Recent 3 BMP's:  Recent Labs   Lab Test 09/12/19  0800      POTASSIUM 4.3   CHLORIDE 105   CO2 27   BUN 29   CR 0.88   ANIONGAP 6   RONNELL 9.1   GLC 64*     Most Recent 2 LFT's:  Recent Labs   Lab Test 09/12/19  0800   AST 14   ALT 16   ALKPHOS 91   BILITOTAL 0.4     Most Recent TSH and T4:No lab results found.  Most Recent ESR & CRP:  Recent Labs   Lab Test 09/19/19  1100 09/12/19  0800   SED  --  16   CRP 15.8* 19.8*       ASSESSMENT/PLAN:     PMR (polymyalgia rheumatica) (H)  Chronic pain of right knee  Late " onset Alzheimer's disease without behavioral disturbance  Falls frequently  Spinal stenosis of thoracic region  - significant dementia, difficult to obtain hx  -  Increase gabapentin to 100 mg TID  - Voltaren gel 1% - 4 gm to R helder TID - dx: knee pain  - 3view XR R knee - dx: R knee pain  - continue scheduled tylenol  - continue physical therapy for strenghtening      Electronically signed by:  KENYON Allen CNP

## 2019-09-23 NOTE — LETTER
9/23/2019        RE: Lizzy Adames  [address]  [place] [address]        [facility]  [place] Medical Record Number:  [id number]  Place of Service where encounter took place:  [facility] (S) [[id number]]  Chief Complaint   Patient presents with     Nursing Home Acute       HPI:    Lizzy Adames  is a 89 year old (7/21/1926), who is being seen today for an episodic care visit.  HPI information obtained from: facility chart records, facility staff, patient report, [facility] chart review and Care Everywhere [facility] chart review.     Seeing patient today for a follow up.   Patient started on prednisone for PMR with diffuse pain and elevated CRP. Initially strength improved and patient was able to stand with assist after prednisone was started.   Today staff report significant pain in patient's knee when standing with the EZ stand. Her knee appears to give out and she is not able to bear wt on right knee.     Met with patient in her room. She denies pain while sitting. She is calm and cooperative.     Past Medical and Surgical History reviewed in Epic today.    MEDICATIONS:  Current Outpatient Medications   Medication Sig Dispense Refill     acetaminophen (TYLENOL) 325 MG tablet Take 650 mg by mouth every 4 hours as needed for mild pain       acetaminophen (TYLENOL) 500 MG tablet Take 1,000 mg by mouth 3 times daily       diclofenac (VOLTAREN) 1 % topical gel Place 4 g onto the skin 3 times daily Apply to Right knee       furosemide (LASIX) 40 MG tablet Take 40 mg by mouth daily       gabapentin (NEURONTIN) 100 MG capsule Take 100 mg by mouth 3 times daily        levothyroxine (SYNTHROID/LEVOTHROID) 25 MCG tablet Take 25 mcg by mouth daily       lisinopril (PRINIVIL/ZESTRIL) 2.5 MG tablet Take 2.5 mg by mouth daily       melatonin 3 MG tablet Take 6 mg by mouth nightly as needed for sleep        metoprolol succinate ER (TOPROL-XL) 25 MG 24 hr tablet Take 12.5 mg by mouth  "daily        miconazole (MICATIN) 2 % AERP powder Apply to affected area topically 2 times daily as needed       phenylephrine-shark liver oil-mineral oil-petrolatum (PREPARATION H) 0.25-14-74.9 % rectal ointment Place rectally 3 times daily as needed for hemorrhoids       PREDNISONE PO Take 12.5 mg by mouth daily       QUEtiapine (SEROQUEL) 25 MG tablet Take 25 mg by mouth 2 times daily       senna-docusate (SENOKOT-S/PERICOLACE) 8.6-50 MG tablet Take 1 tablet by mouth daily as needed for constipation       traZODone (DESYREL) 50 MG tablet Take 12.5 mg by mouth At Bedtime        TRAZODONE HCL PO Take 12.5 mg by mouth At Bedtime         REVIEW OF SYSTEMS:  Limited secondary to cognitive impairment but today pt reports no pain    Objective:  BP (!) 157/83   Pulse 72   Temp 98  F (36.7  C)   Resp 16   Ht 1.727 m (5' 8\")   Wt 100.7 kg (222 lb)   SpO2 90%   BMI 33.75 kg/m     Exam:  GENERAL APPEARANCE:  Alert, in no distress, morbidly obese  RESP:  lungs clear to auscultation , no respiratory distress  CV:  regular rate and rhythm, no murmur, rub, or gallop, +1 BLE edema (teds on)  ABDOMEN:  soft, obese, no guarding or rebound, bowel sounds normal  M/S:   siginifcant tenderness to right medial knee, + crepitus to ROM  SKIN:  Inspection of skin and subcutaneous tissue baseline  PSYCH:  insight and judgement impaired, memory impaired , affect and mood normal    Labs:     Most Recent 3 CBC's:  Recent Labs   Lab Test 09/12/19  0800   WBC 7.5   HGB 14.1   MCV 92        Most Recent 3 BMP's:  Recent Labs   Lab Test 09/12/19  0800      POTASSIUM 4.3   CHLORIDE 105   CO2 27   BUN 29   CR 0.88   ANIONGAP 6   RONNELL 9.1   GLC 64*     Most Recent 2 LFT's:  Recent Labs   Lab Test 09/12/19  0800   AST 14   ALT 16   ALKPHOS 91   BILITOTAL 0.4     Most Recent TSH and T4:No lab results found.  Most Recent ESR & CRP:  Recent Labs   Lab Test 09/19/19  1100 09/12/19  0800   SED  --  16   CRP 15.8* 19.8* "       ASSESSMENT/PLAN:     PMR (polymyalgia rheumatica) (H)  Chronic pain of right knee  Late onset Alzheimer's disease without behavioral disturbance  Falls frequently  Spinal stenosis of thoracic region  - significant dementia, difficult to obtain hx  -  Increase gabapentin to 100 mg TID  - Voltaren gel 1% - 4 gm to R helder TID - dx: knee pain  - 3view XR R knee - dx: R knee pain  - continue scheduled tylenol  - continue physical therapy for strenghtening      Electronically signed by:  KENYON Allen CNP             Sincerely,        KENYON Allen CNP

## 2019-10-07 ENCOUNTER — NURSING HOME VISIT (OUTPATIENT)
Dept: GERIATRICS | Facility: CLINIC | Age: 84
End: 2019-10-07
Payer: COMMERCIAL

## 2019-10-07 ENCOUNTER — TRANSFERRED RECORDS (OUTPATIENT)
Dept: HEALTH INFORMATION MANAGEMENT | Facility: CLINIC | Age: 84
End: 2019-10-07

## 2019-10-07 VITALS
HEART RATE: 79 BPM | BODY MASS INDEX: 33.91 KG/M2 | RESPIRATION RATE: 20 BRPM | OXYGEN SATURATION: 95 % | WEIGHT: 223 LBS | SYSTOLIC BLOOD PRESSURE: 150 MMHG | DIASTOLIC BLOOD PRESSURE: 94 MMHG | TEMPERATURE: 97.5 F

## 2019-10-07 DIAGNOSIS — R26.2 UNABLE TO AMBULATE: ICD-10-CM

## 2019-10-07 DIAGNOSIS — M25.561 CHRONIC PAIN OF RIGHT KNEE: ICD-10-CM

## 2019-10-07 DIAGNOSIS — G62.9 PERIPHERAL POLYNEUROPATHY: ICD-10-CM

## 2019-10-07 DIAGNOSIS — M35.3 PMR (POLYMYALGIA RHEUMATICA) (H): Primary | ICD-10-CM

## 2019-10-07 DIAGNOSIS — I83.891 SYMPTOMATIC VARICOSE VEINS, RIGHT: ICD-10-CM

## 2019-10-07 DIAGNOSIS — G89.29 CHRONIC PAIN OF RIGHT KNEE: ICD-10-CM

## 2019-10-07 PROCEDURE — 99309 SBSQ NF CARE MODERATE MDM 30: CPT | Performed by: NURSE PRACTITIONER

## 2019-10-07 NOTE — LETTER
10/7/2019        RE: Lizzy Adames  [address]  [place] [address]        [facility]  [place] Medical Record Number:  [id number]  Place of Service where encounter took place:  [facility] ([facility]) [[id number]]  Chief Complaint   Patient presents with     RECHECK       HPI:    Lizzy Adames  is a 89 year old (7/21/1926), who is being seen today for an episodic care visit.  HPI information obtained from: facility chart records, facility staff, patient report and [facility] chart review.     Seeing patient for a f/u.   Patient's labs reviewed.   Patient not able to ambulate due to anxiety and fear Right leg with give out and painful.   Overall staff feel patient's generalized pain has improved. Now consistent RLE pain.     Past Medical and Surgical History reviewed in Epic today.    MEDICATIONS:  Current Outpatient Medications   Medication Sig Dispense Refill     acetaminophen (TYLENOL) 500 MG tablet Take 1,000 mg by mouth 3 times daily And also take 1000 mg by mouth every 24 hours as needed       diclofenac (VOLTAREN) 1 % topical gel Place 4 g onto the skin 3 times daily Apply to Right knee       furosemide (LASIX) 40 MG tablet Take 40 mg by mouth daily       gabapentin (NEURONTIN) 100 MG capsule Take 200 mg by mouth 3 times daily        levothyroxine (SYNTHROID/LEVOTHROID) 25 MCG tablet Take 25 mcg by mouth daily       lisinopril (PRINIVIL/ZESTRIL) 2.5 MG tablet Take 2.5 mg by mouth daily       melatonin 3 MG tablet Take 6 mg by mouth nightly as needed for sleep        metoprolol succinate ER (TOPROL-XL) 25 MG 24 hr tablet Take 12.5 mg by mouth daily        miconazole (MICATIN) 2 % AERP powder Apply to affected area topically 2 times daily as needed       phenylephrine-shark liver oil-mineral oil-petrolatum (PREPARATION H) 0.25-14-74.9 % rectal ointment Place rectally 3 times daily as needed for hemorrhoids       PREDNISONE PO Take 10 mg by mouth daily        QUEtiapine  (SEROQUEL) 25 MG tablet Take 25 mg by mouth 2 times daily       senna-docusate (SENOKOT-S/PERICOLACE) 8.6-50 MG tablet Take 1 tablet by mouth daily as needed for constipation       traZODone (DESYREL) 50 MG tablet Take 12.5 mg by mouth At Bedtime        TRAZODONE HCL PO Take 12.5 mg by mouth At Bedtime         REVIEW OF SYSTEMS:  4 point ROS including Respiratory, CV, GI and , other than that noted in the HPI,  is negative    Objective:  BP (!) 150/94   Pulse 79   Temp 97.5  F (36.4  C)   Resp 20   Wt 101.2 kg (223 lb)   SpO2 95%   BMI 33.91 kg/m     Exam:  GENERAL APPEARANCE:  Alert, in no distress  RESP:  lungs clear to auscultation , no respiratory distress  CV:  regular rate and rhythm, no murmur, rub, or gallop, +1-2 RLE edema  ABDOMEN:  no guarding or rebound, bowel sounds normal  M/S:   limited ROM Right knee, not able to stand or ambulate  SKIN:  Right lower leg with  varicose veins that are tender, dilated, elongated, and tortuous  ,   PSYCH:  insight and judgement impaired, memory impaired , affect and mood normal    Labs:   CBC RESULTS:   Recent Labs   Lab Test 09/12/19  0800   WBC 7.5   RBC 4.85   HGB 14.1   HCT 44.5   MCV 92   MCH 29.1   MCHC 31.7   RDW 14.1          Last Basic Metabolic Panel:  Recent Labs   Lab Test 09/12/19  0800      POTASSIUM 4.3   CHLORIDE 105   RONNELL 9.1   CO2 27   BUN 29   CR 0.88   GLC 64*       Liver Function Studies -   Recent Labs   Lab Test 09/12/19  0800   PROTTOTAL 7.4   ALBUMIN 3.4   BILITOTAL 0.4   ALKPHOS 91   AST 14   ALT 16         ASSESSMENT/PLAN:  PMR (polymyalgia rheumatica) (H)  - CRP trending down, pain improving  - will decrease prednisone from 12.5 to 10 mg  - labs ordered    Chronic pain of right knee  Peripheral polyneuropathy  Unable to ambulate  Symptomatic varicose veins, right  - working with therapy, not ambulating due to right leg pain/weakness  - has tender superficial varicose veins, will get an US to look for thrombosis  - will  increase gabapentin due to ongoing leg pain      transcribed by : Natividad Fofana  Orders:  1. Duplex US R leg - dx: varicose veins, R leg pain  2. CRP - dx: PMR  3. Increase Gabapentin to 200 mg TID  4. Decrease Prednisone to 10 mg every day - dx: PMR      Electronically signed by:  KENYON Allen CNP             Sincerely,        KENYON Allen CNP

## 2019-10-07 NOTE — PROGRESS NOTES
[facility]  [place] Medical Record Number:  [id number]  Place of Service where encounter took place:  [facility] ([facility]) [[id number]]  Chief Complaint   Patient presents with     RECHECK       HPI:    Lizzy Adames  is a 89 year old (7/21/1926), who is being seen today for an episodic care visit.  HPI information obtained from: facility chart records, facility staff, patient report and [facility] chart review.     Seeing patient for a f/u.   Patient's labs reviewed.   Patient not able to ambulate due to anxiety and fear Right leg with give out and painful.   Overall staff feel patient's generalized pain has improved. Now consistent RLE pain.     Past Medical and Surgical History reviewed in Epic today.    MEDICATIONS:  Current Outpatient Medications   Medication Sig Dispense Refill     acetaminophen (TYLENOL) 500 MG tablet Take 1,000 mg by mouth 3 times daily And also take 1000 mg by mouth every 24 hours as needed       diclofenac (VOLTAREN) 1 % topical gel Place 4 g onto the skin 3 times daily Apply to Right knee       furosemide (LASIX) 40 MG tablet Take 40 mg by mouth daily       gabapentin (NEURONTIN) 100 MG capsule Take 200 mg by mouth 3 times daily        levothyroxine (SYNTHROID/LEVOTHROID) 25 MCG tablet Take 25 mcg by mouth daily       lisinopril (PRINIVIL/ZESTRIL) 2.5 MG tablet Take 2.5 mg by mouth daily       melatonin 3 MG tablet Take 6 mg by mouth nightly as needed for sleep        metoprolol succinate ER (TOPROL-XL) 25 MG 24 hr tablet Take 12.5 mg by mouth daily        miconazole (MICATIN) 2 % AERP powder Apply to affected area topically 2 times daily as needed       phenylephrine-shark liver oil-mineral oil-petrolatum (PREPARATION H) 0.25-14-74.9 % rectal ointment Place rectally 3 times daily as needed for hemorrhoids       PREDNISONE PO Take 10 mg by mouth daily        QUEtiapine (SEROQUEL) 25 MG tablet Take 25 mg by mouth 2 times daily        senna-docusate (SENOKOT-S/PERICOLACE) 8.6-50 MG tablet Take 1 tablet by mouth daily as needed for constipation       traZODone (DESYREL) 50 MG tablet Take 12.5 mg by mouth At Bedtime        TRAZODONE HCL PO Take 12.5 mg by mouth At Bedtime         REVIEW OF SYSTEMS:  4 point ROS including Respiratory, CV, GI and , other than that noted in the HPI,  is negative    Objective:  BP (!) 150/94   Pulse 79   Temp 97.5  F (36.4  C)   Resp 20   Wt 101.2 kg (223 lb)   SpO2 95%   BMI 33.91 kg/m    Exam:  GENERAL APPEARANCE:  Alert, in no distress  RESP:  lungs clear to auscultation , no respiratory distress  CV:  regular rate and rhythm, no murmur, rub, or gallop, +1-2 RLE edema  ABDOMEN:  no guarding or rebound, bowel sounds normal  M/S:   limited ROM Right knee, not able to stand or ambulate  SKIN:  Right lower leg with  varicose veins that are tender, dilated, elongated, and tortuous  ,   PSYCH:  insight and judgement impaired, memory impaired , affect and mood normal    Labs:   CBC RESULTS:   Recent Labs   Lab Test 09/12/19  0800   WBC 7.5   RBC 4.85   HGB 14.1   HCT 44.5   MCV 92   MCH 29.1   MCHC 31.7   RDW 14.1          Last Basic Metabolic Panel:  Recent Labs   Lab Test 09/12/19  0800      POTASSIUM 4.3   CHLORIDE 105   RONNELL 9.1   CO2 27   BUN 29   CR 0.88   GLC 64*       Liver Function Studies -   Recent Labs   Lab Test 09/12/19  0800   PROTTOTAL 7.4   ALBUMIN 3.4   BILITOTAL 0.4   ALKPHOS 91   AST 14   ALT 16         ASSESSMENT/PLAN:  PMR (polymyalgia rheumatica) (H)  - CRP trending down, pain improving  - will decrease prednisone from 12.5 to 10 mg  - labs ordered    Chronic pain of right knee  Peripheral polyneuropathy  Unable to ambulate  Symptomatic varicose veins, right  - working with therapy, not ambulating due to right leg pain/weakness  - has tender superficial varicose veins, will get an US to look for thrombosis  - will increase gabapentin due to ongoing leg pain      transcribed by  : Natividad Fofana  Orders:  1. Duplex US R leg - dx: varicose veins, R leg pain  2. CRP - dx: PMR  3. Increase Gabapentin to 200 mg TID  4. Decrease Prednisone to 10 mg every day - dx: PMR      Electronically signed by:  KENYON Allen CNP

## 2019-10-09 ENCOUNTER — NURSING HOME VISIT (OUTPATIENT)
Dept: GERIATRICS | Facility: CLINIC | Age: 84
End: 2019-10-09
Payer: COMMERCIAL

## 2019-10-09 VITALS
RESPIRATION RATE: 16 BRPM | TEMPERATURE: 97.8 F | DIASTOLIC BLOOD PRESSURE: 57 MMHG | WEIGHT: 224.8 LBS | SYSTOLIC BLOOD PRESSURE: 97 MMHG | BODY MASS INDEX: 34.18 KG/M2 | HEART RATE: 50 BPM | OXYGEN SATURATION: 91 %

## 2019-10-09 DIAGNOSIS — I83.811 VARICOSE VEINS OF RIGHT LOWER EXTREMITY WITH PAIN: Primary | ICD-10-CM

## 2019-10-09 PROCEDURE — 99309 SBSQ NF CARE MODERATE MDM 30: CPT | Performed by: FAMILY MEDICINE
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
## 2019-10-10 ENCOUNTER — HOSPITAL LABORATORY (OUTPATIENT)
Facility: OTHER | Age: 84
End: 2019-10-10

## 2019-10-10 LAB — CRP SERPL-MCNC: 9.5 MG/L (ref 0–8)

## 2019-10-17 ENCOUNTER — HOSPITAL LABORATORY (OUTPATIENT)
Facility: OTHER | Age: 84
End: 2019-10-17

## 2019-10-17 LAB — TSH SERPL DL<=0.005 MIU/L-ACNC: 5.88 MU/L (ref 0.4–4)

## 2019-10-22 VITALS
WEIGHT: 229.4 LBS | HEIGHT: 68 IN | HEART RATE: 83 BPM | OXYGEN SATURATION: 95 % | TEMPERATURE: 98.2 F | BODY MASS INDEX: 34.77 KG/M2 | SYSTOLIC BLOOD PRESSURE: 117 MMHG | RESPIRATION RATE: 16 BRPM | DIASTOLIC BLOOD PRESSURE: 78 MMHG

## 2019-10-22 ASSESSMENT — MIFFLIN-ST. JEOR: SCORE: 1494.05

## 2019-10-22 NOTE — PROGRESS NOTES
[facility]  [place] Medical Record Number:  [id number]  Place of Service where encounter took place:  [facility] ([facility]) [[id number]]  Chief Complaint   Patient presents with     RECHECK       HPI:    Lizzy Adames  is a 89 year old (7/21/1926), who is being seen today for an episodic care visit.  HPI information obtained from: facility chart records, facility staff, patient report and [facility] chart review.     Seeing patient for a f/u today.   Per RN no behaviors over the last few days. No sleep concerns reported.     Met with patient in [place] She is alert and calm. She reports ongoing right leg pain. She is not able to stand due to pain.       Past Medical and Surgical History reviewed in Epic today.    MEDICATIONS:  Current Outpatient Medications   Medication Sig Dispense Refill     acetaminophen (TYLENOL) 500 MG tablet Take 1,000 mg by mouth 3 times daily And also take 1000 mg by mouth every 24 hours as needed       furosemide (LASIX) 40 MG tablet Take 40 mg by mouth daily       gabapentin (NEURONTIN) 100 MG capsule Take 200 mg by mouth 3 times daily        levothyroxine (SYNTHROID/LEVOTHROID) 25 MCG tablet Take 25 mcg by mouth daily       lisinopril (PRINIVIL/ZESTRIL) 2.5 MG tablet Take 2.5 mg by mouth daily       melatonin 3 MG tablet Take 6 mg by mouth nightly as needed for sleep        metoprolol succinate ER (TOPROL-XL) 25 MG 24 hr tablet Take 12.5 mg by mouth daily        miconazole (MICATIN) 2 % AERP powder Apply to affected area topically 2 times daily as needed       phenylephrine-shark liver oil-mineral oil-petrolatum (PREPARATION H) 0.25-14-74.9 % rectal ointment Place rectally 3 times daily as needed for hemorrhoids       PREDNISONE PO Take 10 mg by mouth daily        QUEtiapine (SEROQUEL) 25 MG tablet Take 25 mg by mouth 2 times daily       senna-docusate (SENOKOT-S/PERICOLACE) 8.6-50 MG tablet Take 1 tablet by mouth daily as needed for constipation    "    diclofenac (VOLTAREN) 1 % topical gel Place 4 g onto the skin 3 times daily Apply to Right knee       traZODone (DESYREL) 50 MG tablet Take 12.5 mg by mouth At Bedtime        TRAZODONE HCL PO Take 12.5 mg by mouth At Bedtime         REVIEW OF SYSTEMS:  4 point ROS including Respiratory, CV, GI and , other than that noted in the HPI,  is negative    Objective:  /78   Pulse 83   Temp 98.2  F (36.8  C)   Resp 16   Ht 1.727 m (5' 8\")   Wt 104.1 kg (229 lb 6.4 oz)   SpO2 95%   BMI 34.88 kg/m    Exam:  GENERAL APPEARANCE:  Alert, in no distress   RESP: LSCTA  CV: HRR, No edema  SKIN: no rashes or lesions  PSYC: calm, memory impaired    Labs:   Labs done in SNF are in [place] EPIC. Please refer to them using EPIC/Care Everywhere.    ASSESSMENT/PLAN:     Varicose veins of right lower extremity with pain  Symptomatic varicose veins, right  Pain of right lower leg  Late onset Alzheimer's disease without behavioral disturbance (H)  PMR (polymyalgia rheumatica) (H)   -Family agrees to purchase teds, staff to apply when available.Update given to patient's daughters.   This will likely help with pain related to varicose veins. They report they feel overall improvement in c/o's of pain  -Per RN no recent complaints of pain or behaviors, continue prednisone at current dose  -Follow-up labs ordered    transcribed by : Natividad Fofana  Orders:  1. Apply Teds when available. Current support tenso's are to loose  2. CRP/Sed rate - dx: PMR      Electronically signed by:  KENYON Allen CNP         "

## 2019-10-23 ENCOUNTER — NURSING HOME VISIT (OUTPATIENT)
Dept: GERIATRICS | Facility: CLINIC | Age: 84
End: 2019-10-23
Payer: COMMERCIAL

## 2019-10-23 DIAGNOSIS — G30.1 LATE ONSET ALZHEIMER'S DISEASE WITHOUT BEHAVIORAL DISTURBANCE (H): ICD-10-CM

## 2019-10-23 DIAGNOSIS — M79.661 PAIN OF RIGHT LOWER LEG: ICD-10-CM

## 2019-10-23 DIAGNOSIS — F02.80 LATE ONSET ALZHEIMER'S DISEASE WITHOUT BEHAVIORAL DISTURBANCE (H): ICD-10-CM

## 2019-10-23 DIAGNOSIS — I83.891 SYMPTOMATIC VARICOSE VEINS, RIGHT: ICD-10-CM

## 2019-10-23 DIAGNOSIS — I83.811 VARICOSE VEINS OF RIGHT LOWER EXTREMITY WITH PAIN: Primary | ICD-10-CM

## 2019-10-23 DIAGNOSIS — M35.3 PMR (POLYMYALGIA RHEUMATICA) (H): ICD-10-CM

## 2019-10-23 PROCEDURE — 99308 SBSQ NF CARE LOW MDM 20: CPT | Performed by: NURSE PRACTITIONER

## 2019-10-23 PROCEDURE — 99207 ZZC CDG-MDM COMPONENT: MEETS LOW - DOWN CODED: CPT | Performed by: NURSE PRACTITIONER

## 2019-10-23 NOTE — LETTER
10/23/2019        RE: Lizzy Adames  [address]  [place] [address]        [facility]  [place] Medical Record Number:  [id number]  Place of Service where encounter took place:  [facility] [[id number]]  Chief Complaint   Patient presents with     RECHECK       HPI:    Lizzy Adames  is a 89 year old (7/21/1926), who is being seen today for an episodic care visit.  HPI information obtained from: facility chart records, facility staff, patient report and [facility] chart review.     Seeing patient for a f/u today.   Per RN no behaviors over the last few days. No sleep concerns reported.     Met with patient in DR. She is alert and calm. She reports ongoing right leg pain. She is not able to stand due to pain.       Past Medical and Surgical History reviewed in Epic today.    MEDICATIONS:  Current Outpatient Medications   Medication Sig Dispense Refill     acetaminophen (TYLENOL) 500 MG tablet Take 1,000 mg by mouth 3 times daily And also take 1000 mg by mouth every 24 hours as needed       furosemide (LASIX) 40 MG tablet Take 40 mg by mouth daily       gabapentin (NEURONTIN) 100 MG capsule Take 200 mg by mouth 3 times daily        levothyroxine (SYNTHROID/LEVOTHROID) 25 MCG tablet Take 25 mcg by mouth daily       lisinopril (PRINIVIL/ZESTRIL) 2.5 MG tablet Take 2.5 mg by mouth daily       melatonin 3 MG tablet Take 6 mg by mouth nightly as needed for sleep        metoprolol succinate ER (TOPROL-XL) 25 MG 24 hr tablet Take 12.5 mg by mouth daily        miconazole (MICATIN) 2 % AERP powder Apply to affected area topically 2 times daily as needed       phenylephrine-shark liver oil-mineral oil-petrolatum (PREPARATION H) 0.25-14-74.9 % rectal ointment Place rectally 3 times daily as needed for hemorrhoids       PREDNISONE PO Take 10 mg by mouth daily        QUEtiapine (SEROQUEL) 25 MG tablet Take 25 mg by mouth 2 times daily       senna-docusate (SENOKOT-S/PERICOLACE)  "8.6-50 MG tablet Take 1 tablet by mouth daily as needed for constipation       diclofenac (VOLTAREN) 1 % topical gel Place 4 g onto the skin 3 times daily Apply to Right knee       traZODone (DESYREL) 50 MG tablet Take 12.5 mg by mouth At Bedtime        TRAZODONE HCL PO Take 12.5 mg by mouth At Bedtime         REVIEW OF SYSTEMS:  4 point ROS including Respiratory, CV, GI and , other than that noted in the HPI,  is negative    Objective:  /78   Pulse 83   Temp 98.2  F (36.8  C)   Resp 16   Ht 1.727 m (5' 8\")   Wt 104.1 kg (229 lb 6.4 oz)   SpO2 95%   BMI 34.88 kg/m     Exam:  GENERAL APPEARANCE:  Alert, in no distress   RESP: LSCTA  CV: HRR, No edema  SKIN: no rashes or lesions  PSYC: calm, memory impaired    Labs:   Labs done in SNF are in [place] EPIC. Please refer to them using [employer]/Care Everywhere.    ASSESSMENT/PLAN:     Varicose veins of right lower extremity with pain  Symptomatic varicose veins, right  Pain of right lower leg  Late onset Alzheimer's disease without behavioral disturbance (H)  PMR (polymyalgia rheumatica) (H)   -Family agrees to purchase teds, staff to apply when available.Update given to patient's daughters.   This will likely help with pain related to varicose veins. They report they feel overall improvement in c/o's of pain  -Per RN no recent complaints of pain or behaviors, continue prednisone at current dose  -Follow-up labs ordered    transcribed by : Natividad Fofana  Orders:  1. Apply Teds when available. Current support tenso's are to loose  2. CRP/Sed rate - dx: PMR      Electronically signed by:  KENYON Allen CNP             Sincerely,        KENYON Allen CNP    "

## 2019-10-24 ENCOUNTER — TRANSFERRED RECORDS (OUTPATIENT)
Dept: HEALTH INFORMATION MANAGEMENT | Facility: CLINIC | Age: 84
End: 2019-10-24

## 2019-10-24 ENCOUNTER — HOSPITAL LABORATORY (OUTPATIENT)
Facility: OTHER | Age: 84
End: 2019-10-24

## 2019-10-24 LAB
CRP SERPL-MCNC: 8.6 MG/L (ref 0–8)
ERYTHROCYTE [SEDIMENTATION RATE] IN BLOOD BY WESTERGREN METHOD: 10 MM/H (ref 0–30)

## 2019-11-09 ASSESSMENT — MIFFLIN-ST. JEOR: SCORE: 1538.5

## 2019-11-13 ENCOUNTER — NURSING HOME VISIT (OUTPATIENT)
Dept: GERIATRICS | Facility: CLINIC | Age: 84
End: 2019-11-13
Payer: COMMERCIAL

## 2019-11-13 VITALS
RESPIRATION RATE: 16 BRPM | BODY MASS INDEX: 36.25 KG/M2 | HEART RATE: 72 BPM | HEIGHT: 68 IN | SYSTOLIC BLOOD PRESSURE: 127 MMHG | OXYGEN SATURATION: 89 % | DIASTOLIC BLOOD PRESSURE: 87 MMHG | WEIGHT: 239.2 LBS | TEMPERATURE: 98.2 F

## 2019-11-13 DIAGNOSIS — G30.1 LATE ONSET ALZHEIMER'S DISEASE WITHOUT BEHAVIORAL DISTURBANCE (H): Primary | ICD-10-CM

## 2019-11-13 DIAGNOSIS — F02.80 LATE ONSET ALZHEIMER'S DISEASE WITHOUT BEHAVIORAL DISTURBANCE (H): Primary | ICD-10-CM

## 2019-11-13 DIAGNOSIS — M48.04 SPINAL STENOSIS OF THORACIC REGION: ICD-10-CM

## 2019-11-13 DIAGNOSIS — G62.9 PERIPHERAL POLYNEUROPATHY: ICD-10-CM

## 2019-11-13 DIAGNOSIS — I11.0 HYPERTENSIVE HEART DISEASE WITH CHRONIC RIGHT-SIDED CONGESTIVE HEART FAILURE (H): ICD-10-CM

## 2019-11-13 DIAGNOSIS — R54 FRAIL ELDERLY: ICD-10-CM

## 2019-11-13 DIAGNOSIS — I50.812 HYPERTENSIVE HEART DISEASE WITH CHRONIC RIGHT-SIDED CONGESTIVE HEART FAILURE (H): ICD-10-CM

## 2019-11-13 PROCEDURE — 99309 SBSQ NF CARE MODERATE MDM 30: CPT | Performed by: FAMILY MEDICINE

## 2019-11-13 NOTE — LETTER
11/13/2019        RE: Lizzy Adames  [address]  [place] [address]        [facility]  Chief Complaint   Patient presents with     detention Regulatory     [place] Medical Record Number:  [id number]  Place of Service where encounter took place:  [facility] ([facility]) [[id number]]    HPI:    Lizzy Adames  is 89 year old (7/21/1926), who is being seen today for a federally mandated E/M visit.  HPI information obtained from: facility staff, patient report and [facility] chart review.       Today's concerns are:  - PT reports Rt is plateauing with rehab.    - Resident seen and examined. Denies pain, reports breathing is fine, denies chest pain, but endorsed occasional SOB,   - Reports sleep, appetite and BM are fine.   - RN has no concern today.   - GNP has no concern  --------------------------------------------------------------------------------------------------------------------  - - Past Medical, social, family histories, medications, and allergies reviewed and updated  - Medications reviewed: in the chart and EHR.   - Case Management:   I have reviewed the care plan and MDS and do agree with the plan. Patient's desire to return to the community is not present.  Information reviewed:  Medications, vital signs, orders, and nursing notes.    MEDICATIONS:  Current Outpatient Medications   Medication Sig Dispense Refill     acetaminophen (TYLENOL) 500 MG tablet Take 1,000 mg by mouth 3 times daily And also take 1000 mg by mouth every 24 hours as needed       diclofenac (VOLTAREN) 1 % topical gel Place 4 g onto the skin 3 times daily Apply 4 gram transdermally every shift related to PAIN IN RIGHT KNEE (M25.561)       furosemide (LASIX) 40 MG tablet Take 40 mg by mouth daily       gabapentin (NEURONTIN) 100 MG capsule Take 200 mg by mouth 3 times daily        levothyroxine (SYNTHROID/LEVOTHROID) 25 MCG tablet Take 25 mcg by mouth daily       lisinopril (PRINIVIL/ZESTRIL)  "2.5 MG tablet Take 2.5 mg by mouth daily       melatonin 3 MG tablet Take 6 mg by mouth nightly as needed for sleep        metoprolol succinate ER (TOPROL-XL) 25 MG 24 hr tablet Take 12.5 mg by mouth daily        miconazole (MICATIN) 2 % AERP powder Apply to affected area topically 2 times daily as needed       phenylephrine-shark liver oil-mineral oil-petrolatum (PREPARATION H) 0.25-14-74.9 % rectal ointment Place rectally 3 times daily as needed for hemorrhoids       PREDNISONE PO Take 10 mg by mouth daily        QUEtiapine (SEROQUEL) 25 MG tablet Take 25 mg by mouth 2 times daily       senna-docusate (SENOKOT-S/PERICOLACE) 8.6-50 MG tablet Take 1 tablet by mouth daily as needed for constipation         ROS:  Limited secondary to cognitive impairment but today pt reports- see HPI    Vitals:  /87   Pulse 72   Temp 98.2  F (36.8  C)   Resp 16   Ht 1.727 m (5' 8\")   Wt 108.5 kg (239 lb 3.2 oz)   SpO2 (!) 89%   BMI 36.37 kg/m     Body mass index is 36.37 kg/m .  Exam:  GENERAL APPEARANCE:  in no distress, cooperative  RESP:  lungs clear to auscultation   CV:  S1S2 audible, regular HR, no murmur appreciated.   ABDOMEN:  soft, NT/ND, BS audible. no mass appreciated on palpation.   M/S:   no joint deformity noted on observation.   SKIN:  No rash.   NEURO:   Ms strength 45 b/l hands , and 35 BLE.   PSYCH: Fair insight, judgement and memory, speech clear, affected      Lab/Diagnostic data: Reviewed in the chart and EHR.      ASSESSMENT/PLAN  --------------------------------------------------------------  Late onset Alzheimer's disease without behavioral disturbance (H)  - Continue to anticipate needs. Chronic condition, ongoing decline expected.   -  Continue to provide redirection and reassurance as needed. Maintain safe living situation with goals focused on comfort.    Hypertensive heart disease with chronic right-sided congestive heart failure (H): compensated.      Generalized muscle " weakness  Peripheral polyneuropathy  Spinal stenosis  Pain in bilateral lower extremities  - reaching plateau with rehab. Require mechanical lifting.  On prednisone and Neurontin.   - Significant  Deficits requiring NH placement. Requiring extensive assistance from nursing.     Hypothyroidism, unspecified type:  TSH 5.88.      Order: See above, otherwise, continue the rest of the current POC.       Electronically signed by:  Ирина Glass MD      Sincerely,        Ирина Glass MD

## 2019-11-13 NOTE — PROGRESS NOTES
[facility]  Chief Complaint   Patient presents with     half-way Regulatory     [place] Medical Record Number:  [id number]  Place of Service where encounter took place:  [facility] AT [facility] (S) [[id number]]    HPI:    Lizzy Adames  is 89 year old (7/21/1926), who is being seen today for a federally mandated E/M visit.  HPI information obtained from: facility staff, patient report, [facility] chart review and Physical therapist.       Today's concerns are:  - PT reports Rt is plateauing with rehab.    - Resident seen and examined. Denies pain, reports breathing is fine, denies chest pain, but endorsed occasional SOB,   - Reports sleep, appetite and BM are fine.   - RN has no concern today.   - GNP has no concern  --------------------------------------------------------------------------------------------------------------------  - - Past Medical, social, family histories, medications, and allergies reviewed and updated  - Medications reviewed: in the chart and EHR.   - Case Management:   I have reviewed the care plan and MDS and do agree with the plan. Patient's desire to return to the community is not present.  Information reviewed:  Medications, vital signs, orders, and nursing notes.    MEDICATIONS:  Current Outpatient Medications   Medication Sig Dispense Refill     acetaminophen (TYLENOL) 500 MG tablet Take 1,000 mg by mouth 3 times daily And also take 1000 mg by mouth every 24 hours as needed       diclofenac (VOLTAREN) 1 % topical gel Place 4 g onto the skin 3 times daily Apply 4 gram transdermally every shift related to PAIN IN RIGHT KNEE (M25.561)       furosemide (LASIX) 40 MG tablet Take 40 mg by mouth daily       gabapentin (NEURONTIN) 100 MG capsule Take 200 mg by mouth 3 times daily        levothyroxine (SYNTHROID/LEVOTHROID) 25 MCG tablet Take 25 mcg by mouth daily       lisinopril (PRINIVIL/ZESTRIL) 2.5 MG tablet Take 2.5 mg by mouth daily       melatonin  "3 MG tablet Take 6 mg by mouth nightly as needed for sleep        metoprolol succinate ER (TOPROL-XL) 25 MG 24 hr tablet Take 12.5 mg by mouth daily        miconazole (MICATIN) 2 % AERP powder Apply to affected area topically 2 times daily as needed       phenylephrine-shark liver oil-mineral oil-petrolatum (PREPARATION H) 0.25-14-74.9 % rectal ointment Place rectally 3 times daily as needed for hemorrhoids       PREDNISONE PO Take 10 mg by mouth daily        QUEtiapine (SEROQUEL) 25 MG tablet Take 25 mg by mouth 2 times daily       senna-docusate (SENOKOT-S/PERICOLACE) 8.6-50 MG tablet Take 1 tablet by mouth daily as needed for constipation         ROS:  Limited secondary to cognitive impairment but today pt reports- see HPI    Vitals:  /87   Pulse 72   Temp 98.2  F (36.8  C)   Resp 16   Ht 1.727 m (5' 8\")   Wt 108.5 kg (239 lb 3.2 oz)   SpO2 (!) 89%   BMI 36.37 kg/m    Body mass index is 36.37 kg/m .  Exam:  GENERAL APPEARANCE:  in no distress, cooperative  RESP:  lungs clear to auscultation   CV:  S1S2 audible, regular HR, no murmur appreciated.   ABDOMEN:  soft, NT/ND, BS audible. no mass appreciated on palpation.   M/S:   no joint deformity noted on observation.   SKIN:  No rash.   NEURO:   Ms strength 45 b/l hands , and 35 BLE.   PSYCH: Fair insight, judgement and memory, speech clear, affected      Lab/Diagnostic data: Reviewed in the chart and EHR.      ASSESSMENT/PLAN  --------------------------------------------------------------  Late onset Alzheimer's disease without behavioral disturbance (H)  - Continue to anticipate needs. Chronic condition, ongoing decline expected.   -  Continue to provide redirection and reassurance as needed. Maintain safe living situation with goals focused on comfort.    Hypertensive heart disease with chronic right-sided congestive heart failure (H): compensated.      Generalized muscle weakness  Peripheral polyneuropathy  Spinal stenosis  Pain in bilateral " lower extremities  - reaching plateau with rehab. Require mechanical lifting.  On prednisone and Neurontin.   - Significant  Deficits requiring NH placement. Requiring extensive assistance from nursing.     Hypothyroidism, unspecified type:  TSH 5.88.      Order: See above, otherwise, continue the rest of the current POC.       Electronically signed by:  Ирина Glass MD

## 2019-11-19 VITALS
OXYGEN SATURATION: 88 % | WEIGHT: 139.6 LBS | HEART RATE: 83 BPM | RESPIRATION RATE: 16 BRPM | BODY MASS INDEX: 21.16 KG/M2 | SYSTOLIC BLOOD PRESSURE: 153 MMHG | DIASTOLIC BLOOD PRESSURE: 84 MMHG | HEIGHT: 68 IN | TEMPERATURE: 98 F

## 2019-11-19 ASSESSMENT — MIFFLIN-ST. JEOR: SCORE: 1086.72

## 2019-11-19 NOTE — PROGRESS NOTES
[facility]  [place] Medical Record Number:  [id number]  Place of Service where encounter took place:  [facility] ([facility]) [[id number]]  Chief Complaint   Patient presents with     RECHECK       HPI:    Lizzy Adames  is a 89 year old (7/21/1926), who is being seen today for an episodic care visit.  HPI information obtained from: facility chart records, facility staff, patient report and [facility] chart review.     Seeing patient for follow-up visit today.  RN reports 20 pound weight gain over the last month.  No reported increase in edema or dyspnea.  Reports of pain have improved.  No sleep, behavior or mood concerns reported.      Patient here today.  She is sitting in her wheelchair coloring.  She is in no distress and reports no pain today.    Past Medical and Surgical History reviewed in Epic today.    MEDICATIONS:  Current Outpatient Medications   Medication Sig Dispense Refill     acetaminophen (TYLENOL) 500 MG tablet Take 1,000 mg by mouth 3 times daily And also take 1000 mg by mouth every 24 hours as needed       diclofenac (VOLTAREN) 1 % topical gel Place 4 g onto the skin 3 times daily Apply 4 gram transdermally every shift related to PAIN IN RIGHT KNEE (M25.561)       furosemide (LASIX) 40 MG tablet Take 40 mg by mouth daily       gabapentin (NEURONTIN) 100 MG capsule Take 200 mg by mouth 3 times daily        levothyroxine (SYNTHROID/LEVOTHROID) 25 MCG tablet Take 25 mcg by mouth daily       lisinopril (PRINIVIL/ZESTRIL) 2.5 MG tablet Take 2.5 mg by mouth daily       melatonin 3 MG tablet Take 6 mg by mouth At Bedtime        metoprolol succinate ER (TOPROL-XL) 25 MG 24 hr tablet Take 12.5 mg by mouth daily        miconazole (MICATIN) 2 % AERP powder Apply to affected area topically 2 times daily as needed       phenylephrine-shark liver oil-mineral oil-petrolatum (PREPARATION H) 0.25-14-74.9 % rectal ointment Place rectally 3 times daily as needed for  "hemorrhoids       PREDNISONE PO Take 7.5 mg by mouth 1 time daily for 5 days, then 5 mg by mouth 1 times daily for 5 days, then 2.5 mg by mouth 1 time daily for 5 days, then discontinue.       QUEtiapine (SEROQUEL) 25 MG tablet Take 25 mg by mouth 2 times daily       senna-docusate (SENOKOT-S/PERICOLACE) 8.6-50 MG tablet Take 1 tablet by mouth daily as needed for constipation           REVIEW OF SYSTEMS:  Limited secondary to cognitive impairment but today pt reports no pain    Objective:  BP (!) 153/84   Pulse 83   Temp 98  F (36.7  C)   Resp 16   Ht 1.727 m (5' 8\")   Wt 63.3 kg (139 lb 9.6 oz)   SpO2 (!) 88%   BMI 21.23 kg/m    Exam:  GENERAL APPEARANCE:  Alert, in no distress  RESP:  lungs clear to auscultation , no respiratory distress  CV:  regular rate and rhythm, no murmur, rub, or gallop, +1 bilateral lower extremity edema  ABDOMEN:  Obese, positive bowel sounds, no guarding or rebound  SKIN:  Inspection of skin and subcutaneous tissue baseline  PSYCH:  memory impaired , affect and mood normal    Labs:   CBC RESULTS:   Recent Labs   Lab Test 11/21/19 0625 09/12/19  0800   WBC 9.2 7.5   RBC 3.98 4.85   HGB 11.9 14.1   HCT 38.3 44.5   MCV 96 92   MCH 29.9 29.1   MCHC 31.1* 31.7   RDW 17.8* 14.1    307       Last Basic Metabolic Panel:  Recent Labs   Lab Test 11/21/19 0625 09/12/19  0800    138   POTASSIUM 3.9 4.3   CHLORIDE 106 105   RONNELL 8.4* 9.1   CO2 30 27   BUN 35* 29   CR 0.95 0.88   GLC 60* 64*       Liver Function Studies -   Recent Labs   Lab Test 11/21/19 0625 09/12/19  0800   PROTTOTAL 6.3* 7.4   ALBUMIN 3.1* 3.4   BILITOTAL 0.3 0.4   ALKPHOS 58 91   AST 14 14   ALT 21 16       TSH   Date Value Ref Range Status   10/17/2019 5.88 (H) 0.40 - 4.00 mU/L Final       ASSESSMENT/PLAN:     Late onset Alzheimer's disease without behavioral disturbance (H)  Hypertensive heart disease with chronic right-sided congestive heart failure (H)  Frail elderly  PMR (polymyalgia rheumatica) " (H)  Unable to ambulate  Weight gain   -Patient with an approximate 20 pound weight gain over the last month.  Lungs clear, edema stable.  No signs of fluid overload.  Recent weight gain secondary to prednisone use  -Pain has improved, inflammatory markers declining.  We will plan to taper patient off prednisone over the next 15 days  -Follow-up labs ordered  -Nursing to update NP with ongoing weight gain, respiratory distress, or increased edema    transcribed by : Natividad Fofana  Orders:  1. CBC, CMP, Sed rate, CRP - dx: PMR  2. Decrease Prednisone to 7.5 mg every day x 5 days, then 5 mg every day x 5 days, then 2.5 mg every day x 5 days, then discontinue.  3. Discontinue PRN Melatonin      Electronically signed by:  KENYON Allen CNP

## 2019-11-20 ENCOUNTER — NURSING HOME VISIT (OUTPATIENT)
Dept: GERIATRICS | Facility: CLINIC | Age: 84
End: 2019-11-20
Payer: COMMERCIAL

## 2019-11-20 DIAGNOSIS — R26.2 UNABLE TO AMBULATE: ICD-10-CM

## 2019-11-20 DIAGNOSIS — M35.3 PMR (POLYMYALGIA RHEUMATICA) (H): ICD-10-CM

## 2019-11-20 DIAGNOSIS — R54 FRAIL ELDERLY: ICD-10-CM

## 2019-11-20 DIAGNOSIS — I50.812 HYPERTENSIVE HEART DISEASE WITH CHRONIC RIGHT-SIDED CONGESTIVE HEART FAILURE (H): ICD-10-CM

## 2019-11-20 DIAGNOSIS — G30.1 LATE ONSET ALZHEIMER'S DISEASE WITHOUT BEHAVIORAL DISTURBANCE (H): Primary | ICD-10-CM

## 2019-11-20 DIAGNOSIS — F02.80 LATE ONSET ALZHEIMER'S DISEASE WITHOUT BEHAVIORAL DISTURBANCE (H): Primary | ICD-10-CM

## 2019-11-20 DIAGNOSIS — I11.0 HYPERTENSIVE HEART DISEASE WITH CHRONIC RIGHT-SIDED CONGESTIVE HEART FAILURE (H): ICD-10-CM

## 2019-11-20 DIAGNOSIS — R63.5 WEIGHT GAIN: ICD-10-CM

## 2019-11-20 PROCEDURE — 99309 SBSQ NF CARE MODERATE MDM 30: CPT | Performed by: NURSE PRACTITIONER

## 2019-11-20 NOTE — LETTER
11/20/2019        RE: Lizzy Adames  [address]  [place] [address]        [place] [facility]  [place] Medical Record Number:  [id number]  Place of Service where encounter took place:  [facility] ([facility]) [[id number]]  Chief Complaint   Patient presents with     RECHECK       HPI:    Lizzy Adames  is a 89 year old (7/21/1926), who is being seen today for an episodic care visit.  HPI information obtained from: facility chart records, facility staff, patient report and [facility] chart review.     Seeing patient for follow-up visit today.  RN reports 20 pound weight gain over the last month.  No reported increase in edema or dyspnea.  Reports of pain have improved.  No sleep, behavior or mood concerns reported.      Patient here today.  She is sitting in her wheelchair coloring.  She is in no distress and reports no pain today.    Past Medical and Surgical History reviewed in Epic today.    MEDICATIONS:  Current Outpatient Medications   Medication Sig Dispense Refill     acetaminophen (TYLENOL) 500 MG tablet Take 1,000 mg by mouth 3 times daily And also take 1000 mg by mouth every 24 hours as needed       diclofenac (VOLTAREN) 1 % topical gel Place 4 g onto the skin 3 times daily Apply 4 gram transdermally every shift related to PAIN IN RIGHT KNEE (M25.561)       furosemide (LASIX) 40 MG tablet Take 40 mg by mouth daily       gabapentin (NEURONTIN) 100 MG capsule Take 200 mg by mouth 3 times daily        levothyroxine (SYNTHROID/LEVOTHROID) 25 MCG tablet Take 25 mcg by mouth daily       lisinopril (PRINIVIL/ZESTRIL) 2.5 MG tablet Take 2.5 mg by mouth daily       melatonin 3 MG tablet Take 6 mg by mouth At Bedtime        metoprolol succinate ER (TOPROL-XL) 25 MG 24 hr tablet Take 12.5 mg by mouth daily        miconazole (MICATIN) 2 % AERP powder Apply to affected area topically 2 times daily as needed       phenylephrine-shark liver oil-mineral oil-petrolatum (PREPARATION H)  "0.25-14-74.9 % rectal ointment Place rectally 3 times daily as needed for hemorrhoids       PREDNISONE PO Take 7.5 mg by mouth 1 time daily for 5 days, then 5 mg by mouth 1 times daily for 5 days, then 2.5 mg by mouth 1 time daily for 5 days, then discontinue.       QUEtiapine (SEROQUEL) 25 MG tablet Take 25 mg by mouth 2 times daily       senna-docusate (SENOKOT-S/PERICOLACE) 8.6-50 MG tablet Take 1 tablet by mouth daily as needed for constipation           REVIEW OF SYSTEMS:  Limited secondary to cognitive impairment but today pt reports no pain    Objective:  BP (!) 153/84   Pulse 83   Temp 98  F (36.7  C)   Resp 16   Ht 1.727 m (5' 8\")   Wt 63.3 kg (139 lb 9.6 oz)   SpO2 (!) 88%   BMI 21.23 kg/m     Exam:  GENERAL APPEARANCE:  Alert, in no distress  RESP:  lungs clear to auscultation , no respiratory distress  CV:  regular rate and rhythm, no murmur, rub, or gallop, +1 bilateral lower extremity edema  ABDOMEN:  Obese, positive bowel sounds, no guarding or rebound  SKIN:  Inspection of skin and subcutaneous tissue baseline  PSYCH:  memory impaired , affect and mood normal    Labs:   CBC RESULTS:   Recent Labs   Lab Test 11/21/19 0625 09/12/19  0800   WBC 9.2 7.5   RBC 3.98 4.85   HGB 11.9 14.1   HCT 38.3 44.5   MCV 96 92   MCH 29.9 29.1   MCHC 31.1* 31.7   RDW 17.8* 14.1    307       Last Basic Metabolic Panel:  Recent Labs   Lab Test 11/21/19 0625 09/12/19  0800    138   POTASSIUM 3.9 4.3   CHLORIDE 106 105   RONNELL 8.4* 9.1   CO2 30 27   BUN 35* 29   CR 0.95 0.88   GLC 60* 64*       Liver Function Studies -   Recent Labs   Lab Test 11/21/19 0625 09/12/19  0800   PROTTOTAL 6.3* 7.4   ALBUMIN 3.1* 3.4   BILITOTAL 0.3 0.4   ALKPHOS 58 91   AST 14 14   ALT 21 16       TSH   Date Value Ref Range Status   10/17/2019 5.88 (H) 0.40 - 4.00 mU/L Final       ASSESSMENT/PLAN:     Late onset Alzheimer's disease without behavioral disturbance (H)  Hypertensive heart disease with chronic right-sided " congestive heart failure (H)  Frail elderly  PMR (polymyalgia rheumatica) (H)  Unable to ambulate  Weight gain   -Patient with an approximate 20 pound weight gain over the last month.  Lungs clear, edema stable.  No signs of fluid overload.  Recent weight gain secondary to prednisone use  -Pain has improved, inflammatory markers declining.  We will plan to taper patient off prednisone over the next 15 days  -Follow-up labs ordered  -Nursing to update NP with ongoing weight gain, respiratory distress, or increased edema    transcribed by : Natividad Fofana  Orders:  1. CBC, CMP, Sed rate, CRP - dx: PMR  2. Decrease Prednisone to 7.5 mg every day x 5 days, then 5 mg every day x 5 days, then 2.5 mg every day x 5 days, then discontinue.  3. Discontinue PRN Melatonin      Electronically signed by:  KENYON Allen CNP             Sincerely,        KENYON Allen CNP

## 2019-11-21 ENCOUNTER — HOSPITAL LABORATORY (OUTPATIENT)
Facility: OTHER | Age: 84
End: 2019-11-21

## 2019-11-21 LAB
ALBUMIN SERPL-MCNC: 3.1 G/DL (ref 3.4–5)
ALP SERPL-CCNC: 58 U/L (ref 40–150)
ALT SERPL W P-5'-P-CCNC: 21 U/L (ref 0–50)
ANION GAP SERPL CALCULATED.3IONS-SCNC: 4 MMOL/L (ref 3–14)
AST SERPL W P-5'-P-CCNC: 14 U/L (ref 0–45)
BILIRUB SERPL-MCNC: 0.3 MG/DL (ref 0.2–1.3)
BUN SERPL-MCNC: 35 MG/DL (ref 7–30)
CALCIUM SERPL-MCNC: 8.4 MG/DL (ref 8.5–10.1)
CHLORIDE SERPL-SCNC: 106 MMOL/L (ref 94–109)
CO2 SERPL-SCNC: 30 MMOL/L (ref 20–32)
CREAT SERPL-MCNC: 0.95 MG/DL (ref 0.52–1.04)
CRP SERPL-MCNC: 11.2 MG/L (ref 0–8)
ERYTHROCYTE [DISTWIDTH] IN BLOOD BY AUTOMATED COUNT: 17.8 % (ref 10–15)
ERYTHROCYTE [SEDIMENTATION RATE] IN BLOOD BY WESTERGREN METHOD: 10 MM/H (ref 0–30)
GFR SERPL CREATININE-BSD FRML MDRD: 52 ML/MIN/{1.73_M2}
GLUCOSE SERPL-MCNC: 60 MG/DL (ref 70–99)
HCT VFR BLD AUTO: 38.3 % (ref 35–47)
HGB BLD-MCNC: 11.9 G/DL (ref 11.7–15.7)
MCH RBC QN AUTO: 29.9 PG (ref 26.5–33)
MCHC RBC AUTO-ENTMCNC: 31.1 G/DL (ref 31.5–36.5)
MCV RBC AUTO: 96 FL (ref 78–100)
PLATELET # BLD AUTO: 298 10E9/L (ref 150–450)
POTASSIUM SERPL-SCNC: 3.9 MMOL/L (ref 3.4–5.3)
PROT SERPL-MCNC: 6.3 G/DL (ref 6.8–8.8)
RBC # BLD AUTO: 3.98 10E12/L (ref 3.8–5.2)
SODIUM SERPL-SCNC: 140 MMOL/L (ref 133–144)
WBC # BLD AUTO: 9.2 10E9/L (ref 4–11)

## 2019-12-26 NOTE — TELEPHONE ENCOUNTER
Called re: 5.2 lb weight gain from yesterday. She is 246.8 lbs today. Weighed 240.8 lb yesterday and 247.2 lbs the day prior. RN reports weight varies from 242-248 lbs. No hypoxia. Does not have edema per nurse. Does not appear to have any labored breathing. Asked them to continue to follow clinically and to call back if any breathing issues or edema is noted.     Estrellita Sanchez MD

## 2020-01-01 ENCOUNTER — NURSING HOME VISIT (OUTPATIENT)
Dept: GERIATRICS | Facility: CLINIC | Age: 85
End: 2020-01-01
Payer: MEDICARE

## 2020-01-01 ENCOUNTER — VIRTUAL VISIT (OUTPATIENT)
Dept: GERIATRICS | Facility: CLINIC | Age: 85
End: 2020-01-01
Payer: MEDICARE

## 2020-01-01 ENCOUNTER — VIRTUAL VISIT (OUTPATIENT)
Dept: GERIATRICS | Facility: CLINIC | Age: 85
End: 2020-01-01
Payer: COMMERCIAL

## 2020-01-01 ENCOUNTER — MEDICAL CORRESPONDENCE (OUTPATIENT)
Dept: HEALTH INFORMATION MANAGEMENT | Facility: CLINIC | Age: 85
End: 2020-01-01

## 2020-01-01 ENCOUNTER — HOSPITAL LABORATORY (OUTPATIENT)
Facility: OTHER | Age: 85
End: 2020-01-01

## 2020-01-01 VITALS
RESPIRATION RATE: 16 BRPM | TEMPERATURE: 98.6 F | WEIGHT: 263.4 LBS | DIASTOLIC BLOOD PRESSURE: 67 MMHG | OXYGEN SATURATION: 91 % | BODY MASS INDEX: 40.05 KG/M2 | HEART RATE: 87 BPM | SYSTOLIC BLOOD PRESSURE: 186 MMHG

## 2020-01-01 VITALS
BODY MASS INDEX: 40.08 KG/M2 | RESPIRATION RATE: 17 BRPM | HEART RATE: 79 BPM | WEIGHT: 263.6 LBS | DIASTOLIC BLOOD PRESSURE: 66 MMHG | SYSTOLIC BLOOD PRESSURE: 135 MMHG | TEMPERATURE: 98.6 F | OXYGEN SATURATION: 89 %

## 2020-01-01 VITALS
BODY MASS INDEX: 37.83 KG/M2 | DIASTOLIC BLOOD PRESSURE: 80 MMHG | WEIGHT: 248.8 LBS | TEMPERATURE: 98 F | RESPIRATION RATE: 16 BRPM | SYSTOLIC BLOOD PRESSURE: 126 MMHG | HEART RATE: 65 BPM | OXYGEN SATURATION: 93 %

## 2020-01-01 VITALS
SYSTOLIC BLOOD PRESSURE: 155 MMHG | HEART RATE: 89 BPM | OXYGEN SATURATION: 90 % | TEMPERATURE: 98.2 F | WEIGHT: 251 LBS | DIASTOLIC BLOOD PRESSURE: 76 MMHG | BODY MASS INDEX: 38.04 KG/M2 | HEIGHT: 68 IN | RESPIRATION RATE: 16 BRPM

## 2020-01-01 VITALS
SYSTOLIC BLOOD PRESSURE: 112 MMHG | RESPIRATION RATE: 20 BRPM | BODY MASS INDEX: 38.16 KG/M2 | TEMPERATURE: 98.4 F | HEART RATE: 64 BPM | DIASTOLIC BLOOD PRESSURE: 55 MMHG | WEIGHT: 251 LBS | OXYGEN SATURATION: 93 %

## 2020-01-01 VITALS
SYSTOLIC BLOOD PRESSURE: 118 MMHG | WEIGHT: 255.6 LBS | OXYGEN SATURATION: 95 % | HEIGHT: 68 IN | RESPIRATION RATE: 16 BRPM | DIASTOLIC BLOOD PRESSURE: 57 MMHG | BODY MASS INDEX: 38.74 KG/M2 | TEMPERATURE: 98.4 F | HEART RATE: 78 BPM

## 2020-01-01 VITALS
RESPIRATION RATE: 16 BRPM | TEMPERATURE: 98.2 F | DIASTOLIC BLOOD PRESSURE: 79 MMHG | HEART RATE: 78 BPM | BODY MASS INDEX: 41.49 KG/M2 | OXYGEN SATURATION: 90 % | SYSTOLIC BLOOD PRESSURE: 149 MMHG | WEIGHT: 273.8 LBS | HEIGHT: 68 IN

## 2020-01-01 DIAGNOSIS — G62.9 PERIPHERAL POLYNEUROPATHY: ICD-10-CM

## 2020-01-01 DIAGNOSIS — E66.01 MORBID OBESITY (H): ICD-10-CM

## 2020-01-01 DIAGNOSIS — E03.9 HYPOTHYROIDISM, UNSPECIFIED TYPE: ICD-10-CM

## 2020-01-01 DIAGNOSIS — Z51.5 HOSPICE CARE PATIENT: ICD-10-CM

## 2020-01-01 DIAGNOSIS — I50.812 HYPERTENSIVE HEART DISEASE WITH CHRONIC RIGHT-SIDED CONGESTIVE HEART FAILURE (H): ICD-10-CM

## 2020-01-01 DIAGNOSIS — M48.04 SPINAL STENOSIS OF THORACIC REGION: ICD-10-CM

## 2020-01-01 DIAGNOSIS — R54 FRAIL ELDERLY: ICD-10-CM

## 2020-01-01 DIAGNOSIS — F02.80 LATE ONSET ALZHEIMER'S DISEASE WITHOUT BEHAVIORAL DISTURBANCE (H): Primary | ICD-10-CM

## 2020-01-01 DIAGNOSIS — M25.561 CHRONIC PAIN OF RIGHT KNEE: ICD-10-CM

## 2020-01-01 DIAGNOSIS — I11.0 HYPERTENSIVE HEART DISEASE WITH CHRONIC RIGHT-SIDED CONGESTIVE HEART FAILURE (H): ICD-10-CM

## 2020-01-01 DIAGNOSIS — R54 FRAIL ELDERLY: Primary | ICD-10-CM

## 2020-01-01 DIAGNOSIS — M35.3 PMR (POLYMYALGIA RHEUMATICA) (H): ICD-10-CM

## 2020-01-01 DIAGNOSIS — F03.918 BEHAVIORAL AND PSYCHOLOGICAL SYMPTOMS OF DEMENTIA (H): ICD-10-CM

## 2020-01-01 DIAGNOSIS — G30.1 LATE ONSET ALZHEIMER'S DISEASE WITHOUT BEHAVIORAL DISTURBANCE (H): Primary | ICD-10-CM

## 2020-01-01 DIAGNOSIS — K59.01 SLOW TRANSIT CONSTIPATION: ICD-10-CM

## 2020-01-01 DIAGNOSIS — F02.818 LATE ONSET ALZHEIMER'S DISEASE WITH BEHAVIORAL DISTURBANCE (H): ICD-10-CM

## 2020-01-01 DIAGNOSIS — G30.1 LATE ONSET ALZHEIMER'S DISEASE WITH BEHAVIORAL DISTURBANCE (H): ICD-10-CM

## 2020-01-01 DIAGNOSIS — G89.29 CHRONIC PAIN OF RIGHT KNEE: ICD-10-CM

## 2020-01-01 DIAGNOSIS — M79.661 PAIN OF RIGHT LOWER LEG: ICD-10-CM

## 2020-01-01 LAB
SARS-COV-2 RNA SPEC QL NAA+PROBE: NOT DETECTED
SPECIMEN SOURCE: NORMAL

## 2020-01-01 PROCEDURE — 99309 SBSQ NF CARE MODERATE MDM 30: CPT | Mod: GT | Performed by: FAMILY MEDICINE

## 2020-01-01 PROCEDURE — 99308 SBSQ NF CARE LOW MDM 20: CPT | Mod: GT | Performed by: NURSE PRACTITIONER

## 2020-01-01 PROCEDURE — 99309 SBSQ NF CARE MODERATE MDM 30: CPT | Mod: GV | Performed by: NURSE PRACTITIONER

## 2020-01-01 PROCEDURE — 99309 SBSQ NF CARE MODERATE MDM 30: CPT | Mod: GV | Performed by: FAMILY MEDICINE

## 2020-01-01 PROCEDURE — 99207 ZZC CDG-MDM COMPONENT: MEETS LOW - DOWN CODED: CPT | Performed by: NURSE PRACTITIONER

## 2020-01-01 RX ORDER — SENNOSIDES 8.6 MG
2 TABLET ORAL 2 TIMES DAILY
COMMUNITY
Start: 2020-01-01

## 2020-01-01 RX ORDER — LORATADINE 10 MG/1
10 TABLET ORAL DAILY
COMMUNITY

## 2020-01-01 RX ORDER — MORPHINE SULFATE 20 MG/ML
5 SOLUTION ORAL
COMMUNITY

## 2020-01-01 RX ORDER — BUMETANIDE 1 MG/1
1 TABLET ORAL 2 TIMES DAILY
COMMUNITY

## 2020-01-01 RX ORDER — QUETIAPINE FUMARATE 25 MG/1
TABLET, FILM COATED ORAL
Start: 2020-01-01 | End: 2020-01-01

## 2020-01-01 RX ORDER — BISACODYL 10 MG
10 SUPPOSITORY, RECTAL RECTAL
COMMUNITY
Start: 2020-01-01 | End: 2020-01-01

## 2020-01-01 RX ORDER — FAMOTIDINE 20 MG/1
20 TABLET, FILM COATED ORAL 2 TIMES DAILY
COMMUNITY

## 2020-01-01 RX ORDER — LOPERAMIDE HCL 2 MG
CAPSULE ORAL
COMMUNITY

## 2020-01-01 RX ORDER — QUETIAPINE FUMARATE 25 MG/1
12.5 TABLET, FILM COATED ORAL 2 TIMES DAILY
COMMUNITY
Start: 2020-01-01

## 2020-01-01 RX ORDER — CALCIUM CARBONATE 500 MG/1
2 TABLET, CHEWABLE ORAL 4 TIMES DAILY PRN
COMMUNITY
Start: 2020-01-01

## 2020-01-01 ASSESSMENT — MIFFLIN-ST. JEOR
SCORE: 1612.89
SCORE: 1690.45
SCORE: 1592.03

## 2020-03-11 NOTE — LETTER
3/11/2020        RE: Lizzy Adames  [address]  [facility] [address]        [place] [facility]  Chief Complaint   Patient presents with     senior living Regulatory     [place] Medical Record Number:  [id number]  [facility] ([facility]) [[id number]]    HPI:    Lizzy Adames  is 89 year old (7/21/1926), who is being seen today for a federally mandated E/M visit.  HPI information obtained from: facility staff, patient report, [facility] chart review and Physical therapist.       Today's concerns are:  - PT reports Rt is plateauing with rehab.    - Resident seen and examined. Denies pain, reports breathing is fine, denies chest pain, but endorsed occasional SOB,   - Reports sleep is fine,  But  Appetite is diminished.  and BM are fine.   - RN has no concern today.   - GNP has no concern  --------------------------------------------------------------------------------------------------------------------  - - Past Medical, social, family histories, medications, and allergies reviewed and updated  - Medications reviewed: in the chart and EHR.   - Case Management:   I have reviewed the care plan and MDS and do agree with the plan. Patient's desire to return to the community is not present.  Information reviewed:  Medications, vital signs, orders, and nursing notes.    MEDICATIONS:  Current Outpatient Medications   Medication Sig Dispense Refill     acetaminophen (TYLENOL) 500 MG tablet Take 1,000 mg by mouth 3 times daily        bumetanide (BUMEX) 1 MG tablet Take 1 mg by mouth 2 times daily       diclofenac (VOLTAREN) 1 % topical gel Place 4 g onto the skin 3 times daily        famotidine (PEPCID) 20 MG tablet Take 20 mg by mouth 2 times daily       gabapentin (NEURONTIN) 100 MG capsule Take 200 mg by mouth 3 times daily        lisinopril (PRINIVIL/ZESTRIL) 2.5 MG tablet Take 2.5 mg by mouth daily       loperamide (IMODIUM) 2 MG capsule  Give 2 mg by mouth as needed for loose stools 4 mg by mouth (2 tabs) first loose stool with 2 mg per subsequent loose stool not to exceed 4 tabs daily.       loratadine (CLARITIN) 10 MG tablet Take 10 mg by mouth daily       metoprolol succinate ER (TOPROL-XL) 25 MG 24 hr tablet Take 12.5 mg by mouth daily        miconazole (MICATIN) 2 % AERP powder Apply to affected area topically 2 times daily as needed       morphine sulfate HIGH CONCENTRATE (ROXANOL) 20 mg/mL (HIGH CONC) solution Take 5 mg by mouth every hour as needed for shortness of breath / dyspnea or breakthrough pain       phenylephrine-shark liver oil-mineral oil-petrolatum (PREPARATION H) 0.25-14-74.9 % rectal ointment Place rectally 3 times daily as needed for hemorrhoids       QUEtiapine (SEROQUEL) 25 MG tablet Take 25 mg by mouth 2 times daily       senna-docusate (SENOKOT-S/PERICOLACE) 8.6-50 MG tablet Take 1 tablet by mouth 2 times daily AND 1 tab daily prn       furosemide (LASIX) 40 MG tablet Take 40 mg by mouth daily       levothyroxine (SYNTHROID/LEVOTHROID) 25 MCG tablet Take 25 mcg by mouth daily       melatonin 3 MG tablet Take 6 mg by mouth At Bedtime        PREDNISONE PO Take 7.5 mg by mouth 1 time daily for 5 days, then 5 mg by mouth 1 times daily for 5 days, then 2.5 mg by mouth 1 time daily for 5 days, then discontinue.         ROS: very  Limited secondary to cognitive impairment but today pt reports- see HPI    Vitals:  /55   Pulse 64   Temp 98.4  F (36.9  C)   Resp 20   Wt 113.9 kg (251 lb)   SpO2 93%   BMI 38.16 kg/m    Body mass index is 38.16 kg/m .  Exam:  GENERAL APPEARANCE:  in no distress, cooperative  RESP:  lungs clear to auscultation   CV:  S1S2 audible, regular HR, loud systolic murmur appreciated.   ABDOMEN:  soft, NT/ND, BS audible. no mass appreciated on palpation.   M/S:   no joint deformity noted on observation.   SKIN:  No rash.   NEURO:   Ms strength 45 b/l hands , and 35 BLE.   PSYCH: AAOx person  only. Thinks she is in [facility]. Poor insight, judgement and memory, speech clear,.       Lab/Diagnostic data: Reviewed in the chart and EHR.      ASSESSMENT/PLAN  --------------------------------------------------------------  Late onset Alzheimer's disease without behavioral disturbance (H)  - Continue to anticipate needs. Chronic condition, ongoing decline expected.   -  Continue to provide redirection and reassurance as needed. Maintain safe living situation with goals focused on comfort.  - on scheduled Seroquel, will start GDR- see below.     Hypertensive heart disease with chronic right-sided congestive heart failure (H): compensated.      Generalized muscle weakness  Peripheral polyneuropathy  Spinal stenosis  Pain in bilateral lower extremities  - analgesia optimal  - Significant  Deficits requiring NH placement. Requiring extensive assistance from nursing.       Hypothyroidism, unspecified type:  TSH 5.88.  off LT4 now.     Hospice Care: Appreciate collaboration with  hospice team for symptom management in collaboration with cares for maximum comfort at end-of-life      transcribed by : Hector Casillas  1. Discontinue Seroquel 25 mg bid  2. Start Seroquel 1.25 mg po in AM. Dx: BPSD  3. Start Seroquel 25 mg po PM.  4.  See above, otherwise, continue the rest of the current POC.       Electronically signed by:  Ирина Glass MD          Sincerely,        Ирина Glass MD

## 2020-03-11 NOTE — PROGRESS NOTES
[place] [facility]  Chief Complaint   Patient presents with     long term Regulatory     [place] Medical Record Number:  [id number]  Place of Service where encounter took place:  THE [facility] ([facility]) [[id number]]    HPI:    Lizzy Adames  is 89 year old (7/21/1926), who is being seen today for a federally mandated E/M visit.  HPI information obtained from: facility staff, patient report, [facility] chart review and Physical therapist.       Today's concerns are:  - PT reports Rt is plateauing with rehab.    - Resident seen and examined. Denies pain, reports breathing is fine, denies chest pain, but endorsed occasional SOB,   - Reports sleep is fine,  But  Appetite is diminished.  and BM are fine.   - RN has no concern today.   - GNP has no concern  --------------------------------------------------------------------------------------------------------------------  - - Past Medical, social, family histories, medications, and allergies reviewed and updated  - Medications reviewed: in the chart and EHR.   - Case Management:   I have reviewed the care plan and MDS and do agree with the plan. Patient's desire to return to the community is not present.  Information reviewed:  Medications, vital signs, orders, and nursing notes.    MEDICATIONS:  Current Outpatient Medications   Medication Sig Dispense Refill     acetaminophen (TYLENOL) 500 MG tablet Take 1,000 mg by mouth 3 times daily        bumetanide (BUMEX) 1 MG tablet Take 1 mg by mouth 2 times daily       diclofenac (VOLTAREN) 1 % topical gel Place 4 g onto the skin 3 times daily        famotidine (PEPCID) 20 MG tablet Take 20 mg by mouth 2 times daily       gabapentin (NEURONTIN) 100 MG capsule Take 200 mg by mouth 3 times daily        lisinopril (PRINIVIL/ZESTRIL) 2.5 MG tablet Take 2.5 mg by mouth daily       loperamide (IMODIUM) 2 MG capsule Give 2 mg by mouth as needed for loose stools 4 mg by mouth (2 tabs) first loose  stool with 2 mg per subsequent loose stool not to exceed 4 tabs daily.       loratadine (CLARITIN) 10 MG tablet Take 10 mg by mouth daily       metoprolol succinate ER (TOPROL-XL) 25 MG 24 hr tablet Take 12.5 mg by mouth daily        miconazole (MICATIN) 2 % AERP powder Apply to affected area topically 2 times daily as needed       morphine sulfate HIGH CONCENTRATE (ROXANOL) 20 mg/mL (HIGH CONC) solution Take 5 mg by mouth every hour as needed for shortness of breath / dyspnea or breakthrough pain       phenylephrine-shark liver oil-mineral oil-petrolatum (PREPARATION H) 0.25-14-74.9 % rectal ointment Place rectally 3 times daily as needed for hemorrhoids       QUEtiapine (SEROQUEL) 25 MG tablet Take 25 mg by mouth 2 times daily       senna-docusate (SENOKOT-S/PERICOLACE) 8.6-50 MG tablet Take 1 tablet by mouth 2 times daily AND 1 tab daily prn       furosemide (LASIX) 40 MG tablet Take 40 mg by mouth daily       levothyroxine (SYNTHROID/LEVOTHROID) 25 MCG tablet Take 25 mcg by mouth daily       melatonin 3 MG tablet Take 6 mg by mouth At Bedtime        PREDNISONE PO Take 7.5 mg by mouth 1 time daily for 5 days, then 5 mg by mouth 1 times daily for 5 days, then 2.5 mg by mouth 1 time daily for 5 days, then discontinue.         ROS: very  Limited secondary to cognitive impairment but today pt reports- see HPI    Vitals:  /55   Pulse 64   Temp 98.4  F (36.9  C)   Resp 20   Wt 113.9 kg (251 lb)   SpO2 93%   BMI 38.16 kg/m    Body mass index is 38.16 kg/m .  Exam:  GENERAL APPEARANCE:  in no distress, cooperative  RESP:  lungs clear to auscultation   CV:  S1S2 audible, regular HR, loud systolic murmur appreciated.   ABDOMEN:  soft, NT/ND, BS audible. no mass appreciated on palpation.   M/S:   no joint deformity noted on observation.   SKIN:  No rash.   NEURO:   Ms strength 45 b/l hands , and 35 BLE.   PSYCH: AAOx person only. Thinks she is in [facility]. Poor insight, judgement and memory, speech clear,.        Lab/Diagnostic data: Reviewed in the chart and EHR.      ASSESSMENT/PLAN  --------------------------------------------------------------  Late onset Alzheimer's disease without behavioral disturbance (H)  - Continue to anticipate needs. Chronic condition, ongoing decline expected.   -  Continue to provide redirection and reassurance as needed. Maintain safe living situation with goals focused on comfort.  - on scheduled Seroquel, will start GDR- see below.     Hypertensive heart disease with chronic right-sided congestive heart failure (H): compensated.      Generalized muscle weakness  Peripheral polyneuropathy  Spinal stenosis  Pain in bilateral lower extremities  - analgesia optimal  - Significant  Deficits requiring NH placement. Requiring extensive assistance from nursing.       Hypothyroidism, unspecified type:  TSH 5.88.  off LT4 now.     Hospice Care: Appreciate collaboration with  hospice team for symptom management in collaboration with cares for maximum comfort at end-of-life      transcribed by : Hector Casillas  1. Discontinue Seroquel 25 mg bid  2. Start Seroquel 1.25 mg po in AM. Dx: BPSD  3. Start Seroquel 25 mg po PM.  4.  See above, otherwise, continue the rest of the current POC.       Electronically signed by:  Ирина Glass MD

## 2020-05-06 NOTE — LETTER
"    5/6/2020        RE: Lizzy Adames  [facility]  [address]  [facility] [address]        [place] [facility]  Type of service: Video Visit  Lizzy Adames is being evaluated via a billable visit due to the restrictions of the Covid-19 pandemic.   The patient or first contact has been notified of following:  \"This video visit will be conducted via a call between you and your provider. We have found that certain health care needs can be provided without the need for an in-person physical exam.  This service lets us provide the care you need with a video conversation. If during the course of the call the provider feels a video visit is not appropriate, you will not be charged for this service.\"   The provider has received verbal consent for a Video or Telephone Visit from the patient and or first contact? Yes  Video or Telephone Start Time: 0916  [place] Medical Record Number:  [id number]  Where the Patient is living now: [facility]  Chief Complaint   Patient presents with     Annual Comprehensive Nursing Home     HPI:    Lizzy Adames  is a 89 year old  (7/21/1926), who is being seen today for an annual comprehensive visit. HPI information obtained from: facility chart records, facility staff, patient report and [facility] chart review.      Seeing patient today for a regulatory visit.   Patient is alert and talkative today. She denies pain. She reports difficulty having a BM. No abd pain. Appetite at baseline.   No fever, chills or cough.     No RN concerns.     ALLERGIES: Aspirin and Penicillins  PAST MEDICAL HISTORY:  has no past medical history on file.  PAST SURGICAL HISTORY:  has no past surgical history on file.  IMMUNIZATIONS:    There is no immunization history on file for this patient.  Above immunizations pulled from [employer]. [facility] and facility records also reconciled. Outstanding information sent to  to update [employer] .  Future immunizations are deferred " as: not clinically appropriate given goals of care    Current Outpatient Medications   Medication Sig Dispense Refill     acetaminophen (TYLENOL) 500 MG tablet Take 1,000 mg by mouth 3 times daily        bumetanide (BUMEX) 1 MG tablet Take 1 mg by mouth 2 times daily       calcium carbonate (TUMS) 500 MG chewable tablet Take 2 chew tab by mouth 4 times daily as needed for heartburn 2 tablets by mouth as needed up to 4x daily PRN       diclofenac (VOLTAREN) 1 % topical gel Place 4 g onto the skin 3 times daily        famotidine (PEPCID) 20 MG tablet Take 20 mg by mouth 2 times daily       gabapentin (NEURONTIN) 100 MG capsule Take 200 mg by mouth 3 times daily        lisinopril (PRINIVIL/ZESTRIL) 2.5 MG tablet Take 2.5 mg by mouth daily       loperamide (IMODIUM) 2 MG capsule Give 2 mg by mouth as needed for loose stools 4 mg by mouth (2 tabs) first loose stool with 2 mg per subsequent loose stool not to exceed 4 tabs daily.       loratadine (CLARITIN) 10 MG tablet Take 10 mg by mouth daily       metoprolol succinate ER (TOPROL-XL) 25 MG 24 hr tablet Take 12.5 mg by mouth daily        miconazole (MICATIN) 2 % AERP powder Apply to affected area topically 2 times daily as needed       morphine sulfate HIGH CONCENTRATE (ROXANOL) 20 mg/mL (HIGH CONC) solution Take 5 mg by mouth every hour as needed for shortness of breath / dyspnea or breakthrough pain       phenylephrine-shark liver oil-mineral oil-petrolatum (PREPARATION H) 0.25-14-74.9 % rectal ointment Place rectally 3 times daily as needed for hemorrhoids       QUEtiapine (SEROQUEL) 25 MG tablet Take 25 mg by mouth 2 times daily       senna-docusate (SENOKOT-S/PERICOLACE) 8.6-50 MG tablet Take 1 tablet by mouth daily as needed for constipation Daily PRN       sennosides (SENOKOT) 8.6 MG tablet Take 2 tablets by mouth 2 times daily Increase to 2 tablets bid           Case Management:  I have reviewed the facility/SNF care plan/MDS, including the falls risk, nutrition  and pain screening. I also reviewed the current immunizations, and preventive care. .Future cancer screening is not clinically indicated secondary to age/goals of care Patient's desire to return to the community is not present. Current Level of Care is appropriate.    Advance Directive Discussion:    I reviewed the current advanced directives as reflected in EPIC, the POLST and the facility chart, and verified the congruency of orders. I contacted the first party and left a message regarding the plan of Care.  I did not due to cognitive impairment review the advance directives with the resident.     Team Discussion:  I communicated with the appropriate disciplines involved with the Plan of Care:   Nursing    Patient's goal is pain control and comfort.  Information reviewed:  Medications, vital signs, orders, and nursing notes.    ROS:  Limited secondary to cognitive impairment but today pt reports no pain.     Vitals:  /80   Pulse 65   Temp 98  F (36.7  C)   Resp 16   Wt 112.9 kg (248 lb 12.8 oz)   SpO2 93%   BMI 37.83 kg/m   Body mass index is 37.83 kg/m .  Exam:  Seen via tele health  General: Pleasant, seen in patient's room, in no distress  HEENT: Head is normocephalic, Ears and nose normal appearance, no external lip lesions  CV: unable to be examined due to tele health visit  RESP: Normal respiratory effort, non labored breathing, no coughing during exam  GI: not able to assess with virtual visit  MS: Moves all extremities    NEURO: no tremor in extremities  Psych: alert, affect euthymic, speech is normal      Lab/Diagnostic data:   CBC RESULTS:   Recent Labs   Lab Test 11/21/19 0625 09/12/19  0800   WBC 9.2 7.5   RBC 3.98 4.85   HGB 11.9 14.1   HCT 38.3 44.5   MCV 96 92   MCH 29.9 29.1   MCHC 31.1* 31.7   RDW 17.8* 14.1    307       Last Basic Metabolic Panel:  Recent Labs   Lab Test 11/21/19 0625 09/12/19  0800    138   POTASSIUM 3.9 4.3   CHLORIDE 106 105   RONNELL 8.4* 9.1   CO2 30  27   BUN 35* 29   CR 0.95 0.88   GLC 60* 64*       Liver Function Studies -   Recent Labs   Lab Test 11/21/19  0625 09/12/19  0800   PROTTOTAL 6.3* 7.4   ALBUMIN 3.1* 3.4   BILITOTAL 0.3 0.4   ALKPHOS 58 91   AST 14 14   ALT 21 16       TSH   Date Value Ref Range Status   10/17/2019 5.88 (H) 0.40 - 4.00 mU/L Final     ASSESSMENT/PLAN  Late onset Alzheimer's disease without behavioral disturbance (H)  - at baseline, no behaviors, continue seroquel  - needs assistance with ADL's, non ambulatory. Staff to anticipate needs. Expect ongoing decline    Hypertensive heart disease with chronic right-sided congestive heart failure (H)  - no reports of increased edema, no dyspnea, no CP  - Vitals reviewed and stable, continue low dose metoprolol & lisinopril, consider discontinuing if hypotensive or dizzy spells.   - no longer following routine labs, patient is on hospice with a  Goal of comfort    Peripheral polyneuropathy  Spinal stenosis of thoracic region  - pain controlled, has prn morphine and tylenol available if pain    Hypothyroidism, unspecified type  - asymptomatic, not on synthroid    Slow transit constipation  - RN advised to give supp today and increase senna to 2 tabs BID, RN to update if ongoing bowel concerns.    Frail elderly  Hospice care patient  - on [facility], goal is comfort, no longer following routine labs, family prefers no future hospitalizations    transcribed by : Hector Casillas  1. Dulcolax Supp today x 1. Dx: constipation  2. Increase Senna to 2 tabs BID. Dx: constipation.     Electronically signed by:  KENYON Allen CNP       Visit Details  Video or Telephone End Time: 0924  [place] Location (provider location):  [place] [facility]         Sincerely,        KENYON Allen CNP

## 2020-05-06 NOTE — PROGRESS NOTES
"[place] [facility]  Type of service: Video Visit  Lizzy Adames is being evaluated via a billable visit due to the restrictions of the Covid-19 pandemic.   The patient or first contact has been notified of following:  \"This video visit will be conducted via a call between you and your provider. We have found that certain health care needs can be provided without the need for an in-person physical exam.  This service lets us provide the care you need with a video conversation. If during the course of the call the provider feels a video visit is not appropriate, you will not be charged for this service.\"   The provider has received verbal consent for a Video or Telephone Visit from the patient and or first contact? Yes  Video or Telephone Start Time: 0916  [facility] Medical Record Number:  [id number]  Where the Patient is living now: [facility]  Chief Complaint   Patient presents with     Annual Comprehensive Nursing Home     HPI:    Lizzy Adames  is a 89 year old  (7/21/1926), who is being seen today for an annual comprehensive visit. HPI information obtained from: facility chart records, facility staff, patient report and [facility] chart review.      Seeing patient today for a regulatory visit.   Patient is alert and talkative today. She denies pain. She reports difficulty having a BM. No abd pain. Appetite at baseline.   No fever, chills or cough.     No RN concerns.     ALLERGIES: Aspirin and Penicillins  PAST MEDICAL HISTORY:  has no past medical history on file.  PAST SURGICAL HISTORY:  has no past surgical history on file.  IMMUNIZATIONS:    There is no immunization history on file for this patient.  Above immunizations pulled from [facility] [employer]. MIIC and facility records also reconciled. Outstanding information sent to  to update [employer] .  Future immunizations are deferred as: not clinically appropriate given goals of care    Current Outpatient Medications   Medication Sig " Dispense Refill     acetaminophen (TYLENOL) 500 MG tablet Take 1,000 mg by mouth 3 times daily        bumetanide (BUMEX) 1 MG tablet Take 1 mg by mouth 2 times daily       calcium carbonate (TUMS) 500 MG chewable tablet Take 2 chew tab by mouth 4 times daily as needed for heartburn 2 tablets by mouth as needed up to 4x daily PRN       diclofenac (VOLTAREN) 1 % topical gel Place 4 g onto the skin 3 times daily        famotidine (PEPCID) 20 MG tablet Take 20 mg by mouth 2 times daily       gabapentin (NEURONTIN) 100 MG capsule Take 200 mg by mouth 3 times daily        lisinopril (PRINIVIL/ZESTRIL) 2.5 MG tablet Take 2.5 mg by mouth daily       loperamide (IMODIUM) 2 MG capsule Give 2 mg by mouth as needed for loose stools 4 mg by mouth (2 tabs) first loose stool with 2 mg per subsequent loose stool not to exceed 4 tabs daily.       loratadine (CLARITIN) 10 MG tablet Take 10 mg by mouth daily       metoprolol succinate ER (TOPROL-XL) 25 MG 24 hr tablet Take 12.5 mg by mouth daily        miconazole (MICATIN) 2 % AERP powder Apply to affected area topically 2 times daily as needed       morphine sulfate HIGH CONCENTRATE (ROXANOL) 20 mg/mL (HIGH CONC) solution Take 5 mg by mouth every hour as needed for shortness of breath / dyspnea or breakthrough pain       phenylephrine-shark liver oil-mineral oil-petrolatum (PREPARATION H) 0.25-14-74.9 % rectal ointment Place rectally 3 times daily as needed for hemorrhoids       QUEtiapine (SEROQUEL) 25 MG tablet Take 25 mg by mouth 2 times daily       senna-docusate (SENOKOT-S/PERICOLACE) 8.6-50 MG tablet Take 1 tablet by mouth daily as needed for constipation Daily PRN       sennosides (SENOKOT) 8.6 MG tablet Take 2 tablets by mouth 2 times daily Increase to 2 tablets bid           Case Management:  I have reviewed the facility/SNF care plan/MDS, including the falls risk, nutrition and pain screening. I also reviewed the current immunizations, and preventive care. .Future cancer  screening is not clinically indicated secondary to age/goals of care Patient's desire to return to the community is not present. Current Level of Care is appropriate.    Advance Directive Discussion:    I reviewed the current advanced directives as reflected in EPIC, the POLST and the facility chart, and verified the congruency of orders. I contacted the first party and left a message regarding the plan of Care.  I did not due to cognitive impairment review the advance directives with the resident.     Team Discussion:  I communicated with the appropriate disciplines involved with the Plan of Care:   Nursing    Patient's goal is pain control and comfort.  Information reviewed:  Medications, vital signs, orders, and nursing notes.    ROS:  Limited secondary to cognitive impairment but today pt reports no pain.     Vitals:  /80   Pulse 65   Temp 98  F (36.7  C)   Resp 16   Wt 112.9 kg (248 lb 12.8 oz)   SpO2 93%   BMI 37.83 kg/m   Body mass index is 37.83 kg/m .  Exam:  Seen via tele health  General: Pleasant, seen in patient's room, in no distress  HEENT: Head is normocephalic, Ears and nose normal appearance, no external lip lesions  CV: unable to be examined due to tele health visit  RESP: Normal respiratory effort, non labored breathing, no coughing during exam  GI: not able to assess with virtual visit  MS: Moves all extremities    NEURO: no tremor in extremities  Psych: alert, affect euthymic, speech is normal      Lab/Diagnostic data:   CBC RESULTS:   Recent Labs   Lab Test 11/21/19 0625 09/12/19  0800   WBC 9.2 7.5   RBC 3.98 4.85   HGB 11.9 14.1   HCT 38.3 44.5   MCV 96 92   MCH 29.9 29.1   MCHC 31.1* 31.7   RDW 17.8* 14.1    307       Last Basic Metabolic Panel:  Recent Labs   Lab Test 11/21/19 0625 09/12/19  0800    138   POTASSIUM 3.9 4.3   CHLORIDE 106 105   RONNELL 8.4* 9.1   CO2 30 27   BUN 35* 29   CR 0.95 0.88   GLC 60* 64*       Liver Function Studies -   Recent Labs   Lab Test  11/21/19  0625 09/12/19  0800   PROTTOTAL 6.3* 7.4   ALBUMIN 3.1* 3.4   BILITOTAL 0.3 0.4   ALKPHOS 58 91   AST 14 14   ALT 21 16       TSH   Date Value Ref Range Status   10/17/2019 5.88 (H) 0.40 - 4.00 mU/L Final     ASSESSMENT/PLAN  Late onset Alzheimer's disease without behavioral disturbance (H)  - at baseline, no behaviors, continue seroquel  - needs assistance with ADL's, non ambulatory. Staff to anticipate needs. Expect ongoing decline    Hypertensive heart disease with chronic right-sided congestive heart failure (H)  - no reports of increased edema, no dyspnea, no CP  - Vitals reviewed and stable, continue low dose metoprolol & lisinopril, consider discontinuing if hypotensive or dizzy spells.   - no longer following routine labs, patient is on hospice with a  Goal of comfort    Peripheral polyneuropathy  Spinal stenosis of thoracic region  - pain controlled, has prn morphine and tylenol available if pain    Hypothyroidism, unspecified type  - asymptomatic, not on synthroid    Slow transit constipation  - RN advised to give supp today and increase senna to 2 tabs BID, RN to update if ongoing bowel concerns.    Frail elderly  Hospice care patient  - on [facility], goal is comfort, no longer following routine labs, family prefers no future hospitalizations    transcribed by : Hector Casillas  1. Dulcolax Supp today x 1. Dx: constipation  2. Increase Senna to 2 tabs BID. Dx: constipation.     Electronically signed by:  KENYON Allen CNP       Visit Details  Video or Telephone End Time: 0924  Distant Location (provider location):  [facility]

## 2020-05-19 PROBLEM — K59.01 SLOW TRANSIT CONSTIPATION: Status: ACTIVE | Noted: 2017-10-25

## 2020-05-19 PROBLEM — F02.818 ALZHEIMER'S DEMENTIA WITH BEHAVIORAL DISTURBANCE (H): Status: ACTIVE | Noted: 2017-10-25

## 2020-05-19 PROBLEM — R53.1 WEAKNESS: Status: ACTIVE | Noted: 2018-12-30

## 2020-05-19 PROBLEM — Z74.09 MOBILITY IMPAIRED: Status: ACTIVE | Noted: 2018-12-30

## 2020-05-19 PROBLEM — M25.571 PAIN IN JOINT INVOLVING RIGHT ANKLE AND FOOT: Status: ACTIVE | Noted: 2017-10-25

## 2020-05-19 PROBLEM — G60.8 PERIPHERAL SENSORY NEUROPATHY: Status: ACTIVE | Noted: 2018-05-24

## 2020-05-19 PROBLEM — M85.80 OSTEOPENIA, SENILE: Status: ACTIVE | Noted: 2020-01-01

## 2020-05-19 PROBLEM — G30.9 ALZHEIMER'S DEMENTIA WITH BEHAVIORAL DISTURBANCE (H): Status: ACTIVE | Noted: 2017-10-25

## 2020-05-19 PROBLEM — R60.0 BILATERAL LOWER EXTREMITY EDEMA: Status: ACTIVE | Noted: 2017-10-25

## 2020-05-28 NOTE — PROGRESS NOTES
"[place] [facility]   Lizzy Adames is being evaluated via a billable video visit due to the restrictions of the Covid-19 pandemic.   The patient has been notified of following:      \"This video visit will be conducted via a call between you and your provider. We have found that certain health care needs can be provided without the need for an in-person physical exam.  This service lets us provide the care you need with a video conversation. If during the course of the call the provider feels a video visit is not appropriate, you will not be charged for this service.\"   The provider has received verbal consent for a Video Visit from the patient or first contact? Yes  Patient  or facility staff would like the video invitation sent by: N/A   Video Start Time: 0912    [facility] Medical Record Number:  [id number]  [facility] of Location at the time of visit[facility]  Chief Complaint   Patient presents with     RECHECK     HPI:  Lizzy Adames  is a 89 year old (7/21/1926), who is being seen today for a visit.  HPI information obtained from: facility chart records, facility staff, patient report and [facility] chart review.     Seeing patient for a f/u.   Message received from hospice RN regarding increased anxiety in the afternoon. Seroquel dose GDR'd 2 months ago.   DON reports one nsg note with anxiety otherwise no staff reports of behaviors or anxiety. Patient did have a \"bad day\" earlier this week.     Met with patient virtually today. She is alert and smiling. No distress. No reports of anxiety.     Past Medical and Surgical History reviewed in Epic today.  MEDICATIONS:    Current Outpatient Medications   Medication Sig Dispense Refill     acetaminophen (TYLENOL) 500 MG tablet Take 1,000 mg by mouth 3 times daily        bumetanide (BUMEX) 1 MG tablet Take 1 mg by mouth 2 times daily       calcium carbonate (TUMS) 500 MG chewable tablet Take 2 chew tab by mouth 4 times daily as needed for heartburn 2 tablets by " "mouth as needed up to 4x daily PRN       diclofenac (VOLTAREN) 1 % topical gel Place 4 g onto the skin 3 times daily        famotidine (PEPCID) 20 MG tablet Take 20 mg by mouth 2 times daily       gabapentin (NEURONTIN) 100 MG capsule Take 200 mg by mouth 3 times daily        lisinopril (PRINIVIL/ZESTRIL) 2.5 MG tablet Take 2.5 mg by mouth daily       loperamide (IMODIUM) 2 MG capsule Give 2 mg by mouth as needed for loose stools 4 mg by mouth (2 tabs) first loose stool with 2 mg per subsequent loose stool not to exceed 4 tabs daily.       loratadine (CLARITIN) 10 MG tablet Take 10 mg by mouth daily       metoprolol succinate ER (TOPROL-XL) 25 MG 24 hr tablet Take 12.5 mg by mouth daily        miconazole (MICATIN) 2 % AERP powder Apply to affected area topically 2 times daily as needed       morphine sulfate HIGH CONCENTRATE (ROXANOL) 20 mg/mL (HIGH CONC) solution Take 5 mg by mouth every hour as needed for shortness of breath / dyspnea or breakthrough pain       phenylephrine-shark liver oil-mineral oil-petrolatum (PREPARATION H) 0.25-14-74.9 % rectal ointment Place rectally 3 times daily as needed for hemorrhoids       QUEtiapine (SEROQUEL) 25 MG tablet Take 25 mg by mouth 2 times daily       senna-docusate (SENOKOT-S/PERICOLACE) 8.6-50 MG tablet Take 1 tablet by mouth daily as needed for constipation Daily PRN       sennosides (SENOKOT) 8.6 MG tablet Take 2 tablets by mouth 2 times daily Increase to 2 tablets bid       REVIEW OF SYSTEMS: Limited secondary to cognitive impairment but today pt reports no pain.   Objective: BP (!) 155/76   Pulse 89   Temp 98.2  F (36.8  C)   Resp 16   Ht 1.727 m (5' 8\")   Wt 113.9 kg (251 lb)   SpO2 90%   BMI 38.16 kg/m    Limited visit exam done given COVID-19 precautions.   Seen via [facility]  General: Pleasant, seen in patient's room, in no distress  HEENT: Head is normocephalic, Ears and nose normal appearance, no external lip lesions  CV: unable to be examined due to " tele health visit  RESP: Normal respiratory effort, non labored breathing, no coughing during exam  GI: not able to assess with virtual visit  MS: Moves all extremities    NEURO: no tremor in extremities  Psych: alert, affect euthymic      Labs:   pt is on hospice, not following routine labs    ASSESSMENT/PLAN:     Morbid obesity (H)  Late onset Alzheimer's disease with behavioral disturbance (H)  Frail elderly  Hospice care patient   - No med changes today. Update NP if frequent periods of anxiety / agitation.    Electronically signed by:  KENYON Allen CNP     Video-Visit Details  Type of service:  Video Visit  Video End Time (time video stopped): 0916  Distant Location (provider location):  [place] [facility]

## 2020-05-28 NOTE — LETTER
"    5/28/2020        RE: Lizzy Adames  [facility]  [address]  [facility] [address]        [place] [facility]   Lizzy Adames is being evaluated via a billable video visit due to the restrictions of the Covid-19 pandemic.   The patient has been notified of following:      \"This video visit will be conducted via a call between you and your provider. We have found that certain health care needs can be provided without the need for an in-person physical exam.  This service lets us provide the care you need with a video conversation. If during the course of the call the provider feels a video visit is not appropriate, you will not be charged for this service.\"   The provider has received verbal consent for a Video Visit from the patient or first contact? Yes  Patient  or facility staff would like the video invitation sent by: N/A   Video Start Time: 0912    [facility] Medical Record Number:  [id number]  Place of Location at the time of visit: [facility]  Chief Complaint   Patient presents with     RECHECK     HPI:  Lizzy Adames  is a 89 year old (7/21/1926), who is being seen today for a visit.  HPI information obtained from: facility chart records, facility staff, patient report and [facility] chart review.     Seeing patient for a f/u.   Message received from hospice RN regarding increased anxiety in the afternoon. Seroquel dose GDR'd 2 months ago.   DON reports one nsg note with anxiety otherwise no staff reports of behaviors or anxiety. Patient did have a \"bad day\" earlier this week.     Met with patient virtually today. She is alert and smiling. No distress. No reports of anxiety.     Past Medical and Surgical History reviewed in Epic today.  MEDICATIONS:    Current Outpatient Medications   Medication Sig Dispense Refill     acetaminophen (TYLENOL) 500 MG tablet Take 1,000 mg by mouth 3 times daily        bumetanide (BUMEX) 1 MG tablet Take 1 mg by mouth 2 times daily       calcium carbonate " "(TUMS) 500 MG chewable tablet Take 2 chew tab by mouth 4 times daily as needed for heartburn 2 tablets by mouth as needed up to 4x daily PRN       diclofenac (VOLTAREN) 1 % topical gel Place 4 g onto the skin 3 times daily        famotidine (PEPCID) 20 MG tablet Take 20 mg by mouth 2 times daily       gabapentin (NEURONTIN) 100 MG capsule Take 200 mg by mouth 3 times daily        lisinopril (PRINIVIL/ZESTRIL) 2.5 MG tablet Take 2.5 mg by mouth daily       loperamide (IMODIUM) 2 MG capsule Give 2 mg by mouth as needed for loose stools 4 mg by mouth (2 tabs) first loose stool with 2 mg per subsequent loose stool not to exceed 4 tabs daily.       loratadine (CLARITIN) 10 MG tablet Take 10 mg by mouth daily       metoprolol succinate ER (TOPROL-XL) 25 MG 24 hr tablet Take 12.5 mg by mouth daily        miconazole (MICATIN) 2 % AERP powder Apply to affected area topically 2 times daily as needed       morphine sulfate HIGH CONCENTRATE (ROXANOL) 20 mg/mL (HIGH CONC) solution Take 5 mg by mouth every hour as needed for shortness of breath / dyspnea or breakthrough pain       phenylephrine-shark liver oil-mineral oil-petrolatum (PREPARATION H) 0.25-14-74.9 % rectal ointment Place rectally 3 times daily as needed for hemorrhoids       QUEtiapine (SEROQUEL) 25 MG tablet Take 25 mg by mouth 2 times daily       senna-docusate (SENOKOT-S/PERICOLACE) 8.6-50 MG tablet Take 1 tablet by mouth daily as needed for constipation Daily PRN       sennosides (SENOKOT) 8.6 MG tablet Take 2 tablets by mouth 2 times daily Increase to 2 tablets bid       REVIEW OF SYSTEMS: Limited secondary to cognitive impairment but today pt reports no pain.   Objective: BP (!) 155/76   Pulse 89   Temp 98.2  F (36.8  C)   Resp 16   Ht 1.727 m (5' 8\")   Wt 113.9 kg (251 lb)   SpO2 90%   BMI 38.16 kg/m    Limited visit exam done given COVID-19 precautions.   Seen via tele health  General: Pleasant, seen in patient's room, in no distress  HEENT: Head is " normocephalic, Ears and nose normal appearance, no external lip lesions  CV: unable to be examined due to tele health visit  RESP: Normal respiratory effort, non labored breathing, no coughing during exam  GI: not able to assess with virtual visit  MS: Moves all extremities    NEURO: no tremor in extremities  Psych: alert, affect euthymic      Labs:   pt is on hospice, not following routine labs    ASSESSMENT/PLAN:     Morbid obesity (H)  Late onset Alzheimer's disease with behavioral disturbance (H)  Frail elderly  Hospice care patient   - No med changes today. Update NP if frequent periods of anxiety / agitation.    Electronically signed by:  KENYON Allen CNP     Video-Visit Details  Type of service:  Video Visit  Video End Time (time video stopped): 0916  Distant Location (provider location):  [place] [facility]           Sincerely,        KENYON Allen CNP

## 2020-05-29 PROBLEM — E66.01 MORBID OBESITY (H): Status: ACTIVE | Noted: 2020-01-01

## 2020-07-07 NOTE — PROGRESS NOTES
"[facility]   Lizzy Adames is being evaluated via a billable video visit due to the restrictions of the Covid-19 pandemic.   The patient has been notified of following:  \"This video visit will be conducted via a call between you and your provider. We have found that certain health care needs can be provided without the need for an in-person physical exam.  This service lets us provide the care you need with a video conversation. If during the course of the call the provider feels a video visit is not appropriate, you will not be charged for this service.\"   The provider has received verbal consent for a Video Visit from the patient or first contact? Yes  Received verbal consent to use Care Everywhere in order to access labs, documents, histories, and all other needed information to provide care at current facility.  Patient or facility staff would like the video invitation sent by: N/A   Video Start Time: 10:55  [facility] Medical Record Number: [id number]  Place of Location at the time of visit: [facility]  Chief Complaint   Patient presents with     alf Regulatory     HPI:    Lizzy Adames  is 89 year old (7/21/1926), who is being seen today for a federally mandated E/M visit.  HPI information obtained from: facility staff, patient report, [facility] Epic chart review and Physical therapist.       Today's concerns are:  - Pt seen in the presence of RN who graciously assisted with the virtual visit  -  Resident reports doing good, denies pain. RN reports 1+ pitting edema.   -  Resident reports breathing is fine, denies chest pain.   - Reports sleep is fine,  But  Appetite is diminished.  and BM are fine.   --------------------------------------------------------------------------------------------------------------------  - - Past Medical, social, family histories, medications, and allergies reviewed and updated  - Medications reviewed: in the chart and EHR.   - Case Management:   I " have reviewed the care plan and MDS and do agree with the plan. Patient's desire to return to the community is not present.  Information reviewed:  Medications, vital signs, orders, and nursing notes.    MEDICATIONS:  Current Outpatient Medications   Medication Sig Dispense Refill     acetaminophen (TYLENOL) 500 MG tablet Take 1,000 mg by mouth 3 times daily        bumetanide (BUMEX) 1 MG tablet Take 1 mg by mouth 2 times daily       calcium carbonate (TUMS) 500 MG chewable tablet Take 2 chew tab by mouth 4 times daily as needed for heartburn 2 tablets by mouth as needed up to 4x daily PRN       diclofenac (VOLTAREN) 1 % topical gel Place 4 g onto the skin 3 times daily        famotidine (PEPCID) 20 MG tablet Take 20 mg by mouth 2 times daily       gabapentin (NEURONTIN) 100 MG capsule Take 200 mg by mouth 3 times daily        lisinopril (PRINIVIL/ZESTRIL) 2.5 MG tablet Take 2.5 mg by mouth daily       loperamide (IMODIUM) 2 MG capsule Give 2 mg by mouth as needed for loose stools 4 mg by mouth (2 tabs) first loose stool with 2 mg per subsequent loose stool not to exceed 4 tabs daily.       loratadine (CLARITIN) 10 MG tablet Take 10 mg by mouth daily       metoprolol succinate ER (TOPROL-XL) 25 MG 24 hr tablet Take 12.5 mg by mouth daily        miconazole (MICATIN) 2 % AERP powder Apply to affected area topically 2 times daily as needed       morphine sulfate HIGH CONCENTRATE (ROXANOL) 20 mg/mL (HIGH CONC) solution Take 5 mg by mouth every hour as needed for shortness of breath / dyspnea or breakthrough pain       phenylephrine-shark liver oil-mineral oil-petrolatum (PREPARATION H) 0.25-14-74.9 % rectal ointment Place rectally 3 times daily as needed for hemorrhoids       QUEtiapine (SEROQUEL) 25 MG tablet Take 25 mg by mouth 2 times daily       senna-docusate (SENOKOT-S/PERICOLACE) 8.6-50 MG tablet Take 1 tablet by mouth daily as needed for constipation Daily PRN       sennosides (SENOKOT) 8.6 MG tablet Take 2  "tablets by mouth 2 times daily Increase to 2 tablets bid         ROS: very  Limited secondary to cognitive impairment but today pt reports- see HPI    Vitals:  /57   Pulse 78   Temp 98.4  F (36.9  C)   Resp 16   Ht 1.727 m (5' 8\")   Wt 115.9 kg (255 lb 9.6 oz)   SpO2 95%   BMI 38.86 kg/m    Body mass index is 38.86 kg/m .  Exam:Limited visit exam done given COVID-19 precautions.     GENERAL APPEARANCE:  in no distress  RESP:  unlabored breathing  M/S:   no joint deformity noted  SKIN:  no rash noted  NEURO:   no purposeful movement in upper and lower extremities  PSYCH:  affect and mood normal. Poor insight, judgement and memory, speech clear,.       Lab/Diagnostic data: Reviewed in the chart and EHR.      ASSESSMENT/PLAN  --------------------------------------------------------------  Late onset Alzheimer's disease with behavioral disturbance (H)  BPSD (H)  - Continue to anticipate needs. Chronic condition, ongoing decline expected.   -  Continue to provide redirection and reassurance as needed. Maintain safe living situation with goals focused on comfort.  - on scheduled Seroquel, will start GDR- see below.     Hypertensive heart disease with chronic right-sided congestive heart failure (H): compensated.      Generalized muscle weakness  Peripheral polyneuropathy  Spinal stenosis  Frailty  - analgesia optimal  - Significant  Deficits requiring NH placement. Requiring extensive assistance from nursing.       Hypothyroidism, unspecified type:  off LT4 now. No concern    Hospice Care: Appreciate collaboration with  hospice team for symptom management in collaboration with cares for maximum comfort at end-of-life      Order: See above, otherwise, continue the rest of the current POC.       Electronically signed by:  Ирина Glass MD  Video-Visit Details  Type of service:  Video Visit  Video End Time (time video stopped): 10:57  Distant Location (provider location):  [place] [facility]   "

## 2020-07-08 NOTE — LETTER
"    7/8/2020        RE: Lizzy Adames  [facility]  [address]  [facility] [address]        [facility] Regulatory   Lizzy Adames is being evaluated via a billable video visit due to the restrictions of the Covid-19 pandemic.   The patient has been notified of following:  \"This video visit will be conducted via a call between you and your provider. We have found that certain health care needs can be provided without the need for an in-person physical exam.  This service lets us provide the care you need with a video conversation. If during the course of the call the provider feels a video visit is not appropriate, you will not be charged for this service.\"   The provider has received verbal consent for a Video Visit from the patient or first contact? Yes  Received verbal consent to use Care Everywhere in order to access labs, documents, histories, and all other needed information to provide care at current facility.  Patient or facility staff would like the video invitation sent by: N/A   Video Start Time: 10:55  [facility] Medical Record Number: [id number]  Place of Location at the time of visit: [facility]  Chief Complaint   Patient presents with     care home Regulatory     HPI:    Lizzy Adames  is 89 year old (7/21/1926), who is being seen today for a federally mandated E/M visit.  HPI information obtained from: facility staff, patient report, [facility] chart review and Physical therapist.       Today's concerns are:  - Pt seen in the presence of RN who graciously assisted with the virtual visit  -  Resident reports doing good, denies pain. RN reports 1+ pitting edema.   -  Resident reports breathing is fine, denies chest pain.   - Reports sleep is fine,  But  Appetite is diminished.  and BM are fine.   --------------------------------------------------------------------------------------------------------------------  - - Past Medical, social, family histories, medications, and " allergies reviewed and updated  - Medications reviewed: in the chart and EHR.   - Case Management:   I have reviewed the care plan and MDS and do agree with the plan. Patient's desire to return to the community is not present.  Information reviewed:  Medications, vital signs, orders, and nursing notes.    MEDICATIONS:  Current Outpatient Medications   Medication Sig Dispense Refill     acetaminophen (TYLENOL) 500 MG tablet Take 1,000 mg by mouth 3 times daily        bumetanide (BUMEX) 1 MG tablet Take 1 mg by mouth 2 times daily       calcium carbonate (TUMS) 500 MG chewable tablet Take 2 chew tab by mouth 4 times daily as needed for heartburn 2 tablets by mouth as needed up to 4x daily PRN       diclofenac (VOLTAREN) 1 % topical gel Place 4 g onto the skin 3 times daily        famotidine (PEPCID) 20 MG tablet Take 20 mg by mouth 2 times daily       gabapentin (NEURONTIN) 100 MG capsule Take 200 mg by mouth 3 times daily        lisinopril (PRINIVIL/ZESTRIL) 2.5 MG tablet Take 2.5 mg by mouth daily       loperamide (IMODIUM) 2 MG capsule Give 2 mg by mouth as needed for loose stools 4 mg by mouth (2 tabs) first loose stool with 2 mg per subsequent loose stool not to exceed 4 tabs daily.       loratadine (CLARITIN) 10 MG tablet Take 10 mg by mouth daily       metoprolol succinate ER (TOPROL-XL) 25 MG 24 hr tablet Take 12.5 mg by mouth daily        miconazole (MICATIN) 2 % AERP powder Apply to affected area topically 2 times daily as needed       morphine sulfate HIGH CONCENTRATE (ROXANOL) 20 mg/mL (HIGH CONC) solution Take 5 mg by mouth every hour as needed for shortness of breath / dyspnea or breakthrough pain       phenylephrine-shark liver oil-mineral oil-petrolatum (PREPARATION H) 0.25-14-74.9 % rectal ointment Place rectally 3 times daily as needed for hemorrhoids       QUEtiapine (SEROQUEL) 25 MG tablet Take 25 mg by mouth 2 times daily       senna-docusate (SENOKOT-S/PERICOLACE) 8.6-50 MG tablet Take 1 tablet  "by mouth daily as needed for constipation Daily PRN       sennosides (SENOKOT) 8.6 MG tablet Take 2 tablets by mouth 2 times daily Increase to 2 tablets bid         ROS: very  Limited secondary to cognitive impairment but today pt reports- see HPI    Vitals:  /57   Pulse 78   Temp 98.4  F (36.9  C)   Resp 16   Ht 1.727 m (5' 8\")   Wt 115.9 kg (255 lb 9.6 oz)   SpO2 95%   BMI 38.86 kg/m    Body mass index is 38.86 kg/m .  Exam:Limited visit exam done given COVID-19 precautions.     GENERAL APPEARANCE:  in no distress  RESP:  unlabored breathing  M/S:   no joint deformity noted  SKIN:  no rash noted  NEURO:   no purposeful movement in upper and lower extremities  PSYCH:  affect and mood normal. Poor insight, judgement and memory, speech clear,.       Lab/Diagnostic data: Reviewed in the chart and EHR.      ASSESSMENT/PLAN  --------------------------------------------------------------  Late onset Alzheimer's disease with behavioral disturbance (H)  BPSD (H)  - Continue to anticipate needs. Chronic condition, ongoing decline expected.   -  Continue to provide redirection and reassurance as needed. Maintain safe living situation with goals focused on comfort.  - on scheduled Seroquel, will start GDR- see below.     Hypertensive heart disease with chronic right-sided congestive heart failure (H): compensated.      Generalized muscle weakness  Peripheral polyneuropathy  Spinal stenosis  Frailty  - analgesia optimal  - Significant  Deficits requiring NH placement. Requiring extensive assistance from nursing.       Hypothyroidism, unspecified type:  off LT4 now. No concern    Hospice Care: Appreciate collaboration with  hospice team for symptom management in collaboration with cares for maximum comfort at end-of-life      Order: See above, otherwise, continue the rest of the current POC.       Electronically signed by:  Ирина Glass MD  Video-Visit Details  Type of service:  Video Visit  Video End Time (time " video stopped): 10:57  Distant Location (provider location):  [facility]       Sincerely,        Ирина Glass MD

## 2020-09-10 NOTE — PROGRESS NOTES
[facility]  Chief Complaint   Patient presents with     senior living Regulatory     [facility] Medical Record Number:  [id number]  Place of Service where encounter took place:  [facility]) [[id number]]    HPI:    Lizzy Adames  is 89 year old (7/21/1926), who is being seen today for a federally mandated E/M visit.      Seeing patient today for regulatory visit.  RN reports increased anxiety with cares and repositioning.  Patient calm after care is completed.  Is sleeping at baseline.  Enjoys coloring daily.  No fever, chills or breathing concerns.  No reported bowel or bladder concerns.  No recent falls patient does not ambulate.    ALLERGIES:Aspirin and Penicillins  PAST MEDICAL HISTORY:   has no past medical history on file.  PAST SURGICAL HISTORY:   has no past surgical history on file.  FAMILY HISTORY: family history is not on file.  SOCIAL HISTORY:      MEDICATIONS:  Current Outpatient Medications   Medication Sig Dispense Refill     acetaminophen (TYLENOL) 500 MG tablet Take 1,000 mg by mouth 3 times daily        bumetanide (BUMEX) 1 MG tablet Take 1 mg by mouth 2 times daily       calcium carbonate (TUMS) 500 MG chewable tablet Take 2 chew tab by mouth 4 times daily as needed for heartburn 2 tablets by mouth as needed up to 4x daily PRN       diclofenac (VOLTAREN) 1 % topical gel Place 4 g onto the skin 3 times daily        famotidine (PEPCID) 20 MG tablet Take 20 mg by mouth 2 times daily       gabapentin (NEURONTIN) 100 MG capsule Take 200 mg by mouth 3 times daily        lisinopril (PRINIVIL/ZESTRIL) 2.5 MG tablet Take 2.5 mg by mouth daily       loperamide (IMODIUM) 2 MG capsule Give 2 mg by mouth as needed for loose stools 4 mg by mouth (2 tabs) first loose stool with 2 mg per subsequent loose stool not to exceed 4 tabs daily.       loratadine (CLARITIN) 10 MG tablet Take 10 mg by mouth daily       metoprolol succinate ER (TOPROL-XL) 25 MG 24 hr tablet Take  12.5 mg by mouth daily        miconazole (MICATIN) 2 % AERP powder Apply to affected area topically 2 times daily as needed       morphine sulfate HIGH CONCENTRATE (ROXANOL) 20 mg/mL (HIGH CONC) solution Take 5 mg by mouth every hour as needed for shortness of breath / dyspnea or breakthrough pain       phenylephrine-shark liver oil-mineral oil-petrolatum (PREPARATION H) 0.25-14-74.9 % rectal ointment Place rectally 3 times daily as needed for hemorrhoids       QUEtiapine (SEROQUEL) 25 MG tablet Take 12.5 mg in am and 25 mg at night.       senna-docusate (SENOKOT-S/PERICOLACE) 8.6-50 MG tablet Take 1 tablet by mouth daily as needed for constipation Daily PRN       sennosides (SENOKOT) 8.6 MG tablet Take 2 tablets by mouth 2 times daily Increase to 2 tablets bid           Case Management:  I have reviewed the care plan and MDS and do agree with the plan. Patient's desire to return to the community is not assessible due to cognitive impairment. Information reviewed:  Medications, vital signs, orders, and nursing notes.    ROS:  Unobtainable secondary to cognitive impairment.     Vitals:  BP (!) 186/67   Pulse 87   Temp 98.6  F (37  C)   Resp 16   Wt 119.5 kg (263 lb 6.4 oz)   SpO2 91%   BMI 40.05 kg/m    Body mass index is 40.05 kg/m .  Exam:  GENERAL APPEARANCE:  Alert, in no distress, morbidly obese  RESP:  lungs clear to auscultation , no respiratory distress  CV:  irregular rhythm , Regular rate, +2 BLE  ABDOMEN:  normal bowel sounds, soft, nontender, no hepatosplenomegaly or other masses  SKIN:  Inspection of skin and subcutaneous tissue baseline  PSYCH:  insight and judgement impaired, memory impaired     Lab/Diagnostic data:   CBC RESULTS:   Recent Labs   Lab Test 11/21/19 0625 09/12/19  0800   WBC 9.2 7.5   RBC 3.98 4.85   HGB 11.9 14.1   HCT 38.3 44.5   MCV 96 92   MCH 29.9 29.1   MCHC 31.1* 31.7   RDW 17.8* 14.1    307       Last Basic Metabolic Panel:  Recent Labs   Lab Test 11/21/19 0625  09/12/19  0800    138   POTASSIUM 3.9 4.3   CHLORIDE 106 105   RONNELL 8.4* 9.1   CO2 30 27   BUN 35* 29   CR 0.95 0.88   GLC 60* 64*       Liver Function Studies -   Recent Labs   Lab Test 11/21/19  0625 09/12/19  0800   PROTTOTAL 6.3* 7.4   ALBUMIN 3.1* 3.4   BILITOTAL 0.3 0.4   ALKPHOS 58 91   AST 14 14   ALT 21 16       TSH   Date Value Ref Range Status   10/17/2019 5.88 (H) 0.40 - 4.00 mU/L Final         ASSESSMENT/PLAN  Late onset Alzheimer's disease without behavioral disturbance (H)  Behavioral and psychological symptoms of dementia (H)  Hospice care patient  -No recent mood, behavioral sleep concerns.  Patient does have increased anxiety with cares difficult to determine if this is due to anxiety or pain.  Continue Seroquel and gabapentin.  Will increase gabapentin from 203 times a day to 303 times a day as this may help with anxiety and pain.  Nursing or hospice to update with concerns    PMR (polymyalgia rheumatica) (H)  Chronic pain of right knee  -Patient appears to have no pain and verbally denies pain today.  Staff note pain with activities  -Continue Tylenol, gabapentin and Voltaren and PRN morphine if needed    Hypertensive heart disease with chronic right-sided congestive heart failure (H)  -Edema at baseline no abdominal distention no reported weight increase  --180, will increase lisinopril from 2.5 to 5 mg  -Continue lisinopril and Bumex  -Not following routine labs patient is on hospice with a goal of comfort        Electronically signed by:  KENYON Allen CNP

## 2020-10-14 NOTE — PROGRESS NOTES
[facility]  [facility] Medical Record Number:  [id number]  Place of Service where encounter took place:  [facility] access hospital) [[id number]]  Chief Complaint   Patient presents with     Nursing Home Acute       HPI:    Lizzy Adames  is a 89 year old (7/21/1926), who is being seen today for an episodic care visit.      Patient has had no recent behaviors or sleep concerns.   She is eating well. No complaints of pain. Enjoys coloring and visiting.   Recent pharm review recommends attempting GDR of seroquel.     Past Medical and Surgical History reviewed in Epic today.    MEDICATIONS:    Current Outpatient Medications   Medication Sig Dispense Refill     acetaminophen (TYLENOL) 500 MG tablet Take 1,000 mg by mouth 3 times daily        bumetanide (BUMEX) 1 MG tablet Take 1 mg by mouth 2 times daily       calcium carbonate (TUMS) 500 MG chewable tablet Take 2 chew tab by mouth 4 times daily as needed for heartburn 2 tablets by mouth as needed up to 4x daily PRN       diclofenac (VOLTAREN) 1 % topical gel Place 4 g onto the skin 3 times daily        famotidine (PEPCID) 20 MG tablet Take 20 mg by mouth 2 times daily       gabapentin (NEURONTIN) 100 MG capsule Take 300 mg by mouth 3 times daily       lisinopril (PRINIVIL/ZESTRIL) 2.5 MG tablet Take 5 mg by mouth daily       loperamide (IMODIUM) 2 MG capsule Give 2 mg by mouth as needed for loose stools 4 mg by mouth (2 tabs) first loose stool with 2 mg per subsequent loose stool not to exceed 4 tabs daily.       loratadine (CLARITIN) 10 MG tablet Take 10 mg by mouth daily       metoprolol succinate ER (TOPROL-XL) 25 MG 24 hr tablet Take 12.5 mg by mouth daily        miconazole (MICATIN) 2 % AERP powder Apply to affected area topically 2 times daily as needed       morphine sulfate HIGH CONCENTRATE (ROXANOL) 20 mg/mL (HIGH CONC) solution Take 5 mg by mouth every hour as needed for shortness of breath / dyspnea or breakthrough pain        phenylephrine-shark liver oil-mineral oil-petrolatum (PREPARATION H) 0.25-14-74.9 % rectal ointment Place rectally 3 times daily as needed for hemorrhoids       QUEtiapine (SEROQUEL) 25 MG tablet Take 12.5 mg in am and 25 mg at night.       senna-docusate (SENOKOT-S/PERICOLACE) 8.6-50 MG tablet Take 1 tablet by mouth daily as needed for constipation Daily PRN       sennosides (SENOKOT) 8.6 MG tablet Take 2 tablets by mouth 2 times daily Increase to 2 tablets bid           REVIEW OF SYSTEMS:  Limited secondary to cognitive impairment but today pt reports no concerns    Objective:  /66   Pulse 79   Temp 98.6  F (37  C)   Resp 17   Wt 119.6 kg (263 lb 9.6 oz)   SpO2 (!) 89%   BMI 40.08 kg/m    Exam:  GENERAL APPEARANCE:  Alert, in no distress  RESP:  lungs clear to auscultation , no respiratory distress  CV:  regular rate and rhythm, no murmur, rub, or gallop, trace BLE  ABDOMEN:  normal bowel sounds, soft, nontender, no hepatosplenomegaly or other masses  PSYCH:  memory impaired , affect and mood normal    Labs:   Labs done in SNF are in [place] EPIC. Please refer to them using Link To Media/Care Everywhere.    ASSESSMENT/PLAN:     Late onset Alzheimer's disease without behavioral disturbance (H)  Behavioral and psychological symptoms of dementia (H)  Hospice care patient   - moods and sleep stable, will reduce seroquel HS dose to 12.5 mg from 25  - ns or hospice to update with concerns    HTN  - vitals reviewed and stable  - continue cardiac meds      Electronically signed by:  KENYON Allen CNP

## 2020-11-11 NOTE — LETTER
"    11/11/2020        RE: Lizzy Adames  [facility]  [address]  [facility] [address]        [place] [facility] Regulatory   Lizzy Adames is being evaluated via a billable video visit due to the restrictions of the Covid-19 pandemic.   The patient has been notified of following:  \"This video visit will be conducted via a call between you and your provider. We have found that certain health care needs can be provided without the need for an in-person physical exam.  This service lets us provide the care you need with a video conversation. If during the course of the call the provider feels a video visit is not appropriate, you will not be charged for this service.\"   The provider has received verbal consent for a Video Visit from the patient or first contact? Yes  Received verbal consent to use Care Everywhere in order to access labs, documents, histories, and all other needed information to provide care at current facility.  Patient or facility staff would like the video invitation sent by: N/A   Video Start Time: 14:37  [place] Medical Record Number: [id number]  Place of Location at the time of visit: [facility]  Chief Complaint   Patient presents with     intermediate Regulatory     Video Visit     HPI:    Lizzy Adames  is 89 year old (7/21/1926), who is being seen today for a federally mandated E/M visit.  HPI information obtained from: facility staff, patient report, [facility] chart review and Physical therapist.       Today's concerns are:  - Pt seen in the presence of RN who graciously assisted with the virtual visit  - RN reports no concern  - Resident reports doing fine, feeling pretty good.\"I have enough pain, in stomach, not cooperating\"  -  Resident reports breathing is fine, denies chest pain.   --------------------------------------------------------------------------------------------------------------------  - - Past Medical, social, family histories, medications, and " allergies reviewed and updated  - Medications reviewed: in the chart and EHR.   - Case Management:   I have reviewed the care plan and MDS and do agree with the plan. Patient's desire to return to the community is not present.  Information reviewed:  Medications, vital signs, orders, and nursing notes.    MEDICATIONS:  Current Outpatient Medications   Medication Sig Dispense Refill     acetaminophen (TYLENOL) 500 MG tablet Take 1,000 mg by mouth 3 times daily        bumetanide (BUMEX) 1 MG tablet Take 1 mg by mouth 2 times daily       calcium carbonate (TUMS) 500 MG chewable tablet Take 2 chew tab by mouth 4 times daily as needed for heartburn 2 tablets by mouth as needed up to 4x daily PRN       diclofenac (VOLTAREN) 1 % topical gel Place 4 g onto the skin 3 times daily        famotidine (PEPCID) 20 MG tablet Take 20 mg by mouth 2 times daily       gabapentin (NEURONTIN) 100 MG capsule Take 300 mg by mouth 3 times daily       lisinopril (PRINIVIL/ZESTRIL) 2.5 MG tablet Take 5 mg by mouth daily       loperamide (IMODIUM) 2 MG capsule Give 2 mg by mouth as needed for loose stools 4 mg by mouth (2 tabs) first loose stool with 2 mg per subsequent loose stool not to exceed 4 tabs daily.       loratadine (CLARITIN) 10 MG tablet Take 10 mg by mouth daily       metoprolol succinate ER (TOPROL-XL) 25 MG 24 hr tablet Take 12.5 mg by mouth daily        miconazole (MICATIN) 2 % AERP powder Apply to affected area topically 2 times daily as needed       morphine sulfate HIGH CONCENTRATE (ROXANOL) 20 mg/mL (HIGH CONC) solution Take 5 mg by mouth every hour as needed for shortness of breath / dyspnea or breakthrough pain       phenylephrine-shark liver oil-mineral oil-petrolatum (PREPARATION H) 0.25-14-74.9 % rectal ointment Place rectally 3 times daily as needed for hemorrhoids       QUEtiapine (SEROQUEL) 25 MG tablet 0.5 tablets (12.5 mg) 2 times daily Take 12.5 mg in am and 25 mg at night.       senna-docusate  "(SENOKOT-S/PERICOLACE) 8.6-50 MG tablet Take 1 tablet by mouth daily as needed for constipation Daily PRN       sennosides (SENOKOT) 8.6 MG tablet Take 2 tablets by mouth 2 times daily Increase to 2 tablets bid         ROS: very  Limited secondary to cognitive impairment but today pt reports- see HPI    Vitals:  BP (!) 149/79   Pulse 78   Temp 98.2  F (36.8  C)   Resp 16   Ht 1.727 m (5' 8\")   Wt 124.2 kg (273 lb 12.8 oz)   SpO2 90%   BMI 41.63 kg/m    Body mass index is 41.63 kg/m .  Exam:Limited visit exam done given COVID-19 precautions.     GENERAL APPEARANCE:  in no distress  RESP:  unlabored breathing  M/S:   no joint deformity noted  SKIN:  no rash noted  NEURO:   no purposeful movement in upper and lower extremities  PSYCH:  affect and mood normal. Poor insight, judgement and memory, speech clear,.       Lab/Diagnostic data: Reviewed in the chart and EHR.      ASSESSMENT/PLAN  --------------------------------------------------------------  Late onset Alzheimer's disease with behavioral disturbance (H)  BPSD (H)  - Continue to anticipate needs. Chronic condition, ongoing decline expected.   -  Continue to provide redirection and reassurance as needed. Maintain safe living situation with goals focused on comfort.  - 10/14: Seroquel reduced from 25 mg to 12.5 mg. Stable    Hypertensive heart disease with chronic right-sided congestive heart failure (H): compensated.      Generalized muscle weakness  Peripheral polyneuropathy  Spinal stenosis  Frailty  - analgesia optimal  - Significant  Deficits requiring NH placement. Requiring extensive assistance from nursing.     GERD: on Famotidine and TUMs prn. Spoke to RN manager to offer Tums when Resident is symptomatic.     Hypothyroidism, unspecified type:  off LT4 now. No concern    Hospice Care: Appreciate collaboration with  hospice team for symptom management in collaboration with cares for maximum comfort at end-of-life    Order: See above, otherwise, " continue the rest of the current POC.       Electronically signed by:  Ирина Glass MD  Video-Visit Details  Type of service:  Video Visit  Video End Time (time video stopped): 14:40  Distant Location (provider location):  [place] [facility]         Sincerely,        Ирина Glass MD

## 2020-11-11 NOTE — PROGRESS NOTES
"[facility]   Lizzy Adames is being evaluated via a billable video visit due to the restrictions of the Covid-19 pandemic.   The patient has been notified of following:  \"This video visit will be conducted via a call between you and your provider. We have found that certain health care needs can be provided without the need for an in-person physical exam.  This service lets us provide the care you need with a video conversation. If during the course of the call the provider feels a video visit is not appropriate, you will not be charged for this service.\"   The provider has received verbal consent for a Video Visit from the patient or first contact? Yes  Received verbal consent to use Care Everywhere in order to access labs, documents, histories, and all other needed information to provide care at current facility.  Patient or facility staff would like the video invitation sent by: N/A   Video Start Time: 14:37  [facility] Record Number: [id number]  Place of Location at the time of visit: [facility]  Chief Complaint   Patient presents with     longterm Regulatory     Video Visit     HPI:    Lizzy Adames  is 89 year old (7/21/1926), who is being seen today for a federally mandated E/M visit.  HPI information obtained from: facility staff, patient report, [facility] Epic chart review and Physical therapist.       Today's concerns are:  - Pt seen in the presence of RN who graciously assisted with the virtual visit  - RN reports no concern  - Resident reports doing fine, feeling pretty good.\"I have enough pain, in stomach, not cooperating\"  -  Resident reports breathing is fine, denies chest pain.   --------------------------------------------------------------------------------------------------------------------  - - Past Medical, social, family histories, medications, and allergies reviewed and updated  - Medications reviewed: in the chart and EHR.   - Case Management:   I have " reviewed the care plan and MDS and do agree with the plan. Patient's desire to return to the community is not present.  Information reviewed:  Medications, vital signs, orders, and nursing notes.    MEDICATIONS:  Current Outpatient Medications   Medication Sig Dispense Refill     acetaminophen (TYLENOL) 500 MG tablet Take 1,000 mg by mouth 3 times daily        bumetanide (BUMEX) 1 MG tablet Take 1 mg by mouth 2 times daily       calcium carbonate (TUMS) 500 MG chewable tablet Take 2 chew tab by mouth 4 times daily as needed for heartburn 2 tablets by mouth as needed up to 4x daily PRN       diclofenac (VOLTAREN) 1 % topical gel Place 4 g onto the skin 3 times daily        famotidine (PEPCID) 20 MG tablet Take 20 mg by mouth 2 times daily       gabapentin (NEURONTIN) 100 MG capsule Take 300 mg by mouth 3 times daily       lisinopril (PRINIVIL/ZESTRIL) 2.5 MG tablet Take 5 mg by mouth daily       loperamide (IMODIUM) 2 MG capsule Give 2 mg by mouth as needed for loose stools 4 mg by mouth (2 tabs) first loose stool with 2 mg per subsequent loose stool not to exceed 4 tabs daily.       loratadine (CLARITIN) 10 MG tablet Take 10 mg by mouth daily       metoprolol succinate ER (TOPROL-XL) 25 MG 24 hr tablet Take 12.5 mg by mouth daily        miconazole (MICATIN) 2 % AERP powder Apply to affected area topically 2 times daily as needed       morphine sulfate HIGH CONCENTRATE (ROXANOL) 20 mg/mL (HIGH CONC) solution Take 5 mg by mouth every hour as needed for shortness of breath / dyspnea or breakthrough pain       phenylephrine-shark liver oil-mineral oil-petrolatum (PREPARATION H) 0.25-14-74.9 % rectal ointment Place rectally 3 times daily as needed for hemorrhoids       QUEtiapine (SEROQUEL) 25 MG tablet 0.5 tablets (12.5 mg) 2 times daily Take 12.5 mg in am and 25 mg at night.       senna-docusate (SENOKOT-S/PERICOLACE) 8.6-50 MG tablet Take 1 tablet by mouth daily as needed for constipation Daily PRN       sennosides  "(SENOKOT) 8.6 MG tablet Take 2 tablets by mouth 2 times daily Increase to 2 tablets bid         ROS: very  Limited secondary to cognitive impairment but today pt reports- see HPI    Vitals:  BP (!) 149/79   Pulse 78   Temp 98.2  F (36.8  C)   Resp 16   Ht 1.727 m (5' 8\")   Wt 124.2 kg (273 lb 12.8 oz)   SpO2 90%   BMI 41.63 kg/m    Body mass index is 41.63 kg/m .  Exam:Limited visit exam done given COVID-19 precautions.     GENERAL APPEARANCE:  in no distress  RESP:  unlabored breathing  M/S:   no joint deformity noted  SKIN:  no rash noted  NEURO:   no purposeful movement in upper and lower extremities  PSYCH:  affect and mood normal. Poor insight, judgement and memory, speech clear,.       Lab/Diagnostic data: Reviewed in the chart and EHR.      ASSESSMENT/PLAN  --------------------------------------------------------------  Late onset Alzheimer's disease with behavioral disturbance (H)  BPSD (H)  - Continue to anticipate needs. Chronic condition, ongoing decline expected.   -  Continue to provide redirection and reassurance as needed. Maintain safe living situation with goals focused on comfort.  - 10/14: Seroquel reduced from 25 mg to 12.5 mg. Stable    Hypertensive heart disease with chronic right-sided congestive heart failure (H): compensated.      Generalized muscle weakness  Peripheral polyneuropathy  Spinal stenosis  Frailty  - analgesia optimal  - Significant  Deficits requiring NH placement. Requiring extensive assistance from nursing.     GERD: on Famotidine and TUMs prn. Spoke to RN manager to offer Tums when Resident is symptomatic.     Hypothyroidism, unspecified type:  off LT4 now. No concern    Hospice Care: Appreciate collaboration with  hospice team for symptom management in collaboration with cares for maximum comfort at end-of-life    Order: See above, otherwise, continue the rest of the current POC.       Electronically signed by:  Ирина Glass MD  Video-Visit Details  Type of " service:  Video Visit  Video End Time (time video stopped): 14:40  Distant Location (provider location):  [facility]